# Patient Record
Sex: MALE | Race: WHITE | NOT HISPANIC OR LATINO | Employment: OTHER | URBAN - METROPOLITAN AREA
[De-identification: names, ages, dates, MRNs, and addresses within clinical notes are randomized per-mention and may not be internally consistent; named-entity substitution may affect disease eponyms.]

---

## 2020-01-20 ENCOUNTER — OFFICE VISIT (OUTPATIENT)
Dept: URGENT CARE | Facility: CLINIC | Age: 70
End: 2020-01-20
Payer: MEDICARE

## 2020-01-20 VITALS
TEMPERATURE: 98.1 F | HEIGHT: 71 IN | WEIGHT: 220 LBS | HEART RATE: 82 BPM | SYSTOLIC BLOOD PRESSURE: 138 MMHG | BODY MASS INDEX: 30.8 KG/M2 | OXYGEN SATURATION: 96 % | DIASTOLIC BLOOD PRESSURE: 86 MMHG | RESPIRATION RATE: 18 BRPM

## 2020-01-20 DIAGNOSIS — J40 BRONCHITIS: Primary | ICD-10-CM

## 2020-01-20 DIAGNOSIS — J06.9 URI WITH COUGH AND CONGESTION: ICD-10-CM

## 2020-01-20 PROCEDURE — 99204 OFFICE O/P NEW MOD 45 MIN: CPT | Performed by: PHYSICIAN ASSISTANT

## 2020-01-20 RX ORDER — DOXYCYCLINE HYCLATE 100 MG
100 TABLET ORAL 2 TIMES DAILY
Qty: 14 TAB | Refills: 0 | Status: SHIPPED | OUTPATIENT
Start: 2020-01-20 | End: 2020-01-20 | Stop reason: SDUPTHER

## 2020-01-20 RX ORDER — METHYLPREDNISOLONE 4 MG/1
TABLET ORAL
Qty: 21 TAB | Refills: 0 | Status: SHIPPED | OUTPATIENT
Start: 2020-01-20 | End: 2020-01-20 | Stop reason: SDUPTHER

## 2020-01-20 RX ORDER — DOXYCYCLINE HYCLATE 100 MG
100 TABLET ORAL 2 TIMES DAILY
Qty: 14 TAB | Refills: 0 | Status: SHIPPED | OUTPATIENT
Start: 2020-01-20 | End: 2020-01-27

## 2020-01-20 RX ORDER — BENZONATATE 100 MG/1
100 CAPSULE ORAL 3 TIMES DAILY PRN
Qty: 21 CAP | Refills: 0 | Status: SHIPPED | OUTPATIENT
Start: 2020-01-20 | End: 2020-01-20 | Stop reason: SDUPTHER

## 2020-01-20 RX ORDER — BENZONATATE 100 MG/1
100 CAPSULE ORAL 3 TIMES DAILY PRN
Qty: 21 CAP | Refills: 0 | Status: SHIPPED | OUTPATIENT
Start: 2020-01-20 | End: 2020-01-27

## 2020-01-20 RX ORDER — METHYLPREDNISOLONE 4 MG/1
TABLET ORAL
Qty: 21 TAB | Refills: 0 | Status: SHIPPED | OUTPATIENT
Start: 2020-01-20 | End: 2021-04-15

## 2020-01-20 NOTE — PATIENT INSTRUCTIONS
Acute Bronchitis, Adult  Acute bronchitis is when air tubes (bronchi) in the lungs suddenly get swollen. The condition can make it hard to breathe. It can also cause these symptoms:  · A cough.  · Coughing up clear, yellow, or green mucus.  · Wheezing.  · Chest congestion.  · Shortness of breath.  · A fever.  · Body aches.  · Chills.  · A sore throat.  Follow these instructions at home:  Medicines  · Take over-the-counter and prescription medicines only as told by your doctor.  · If you were prescribed an antibiotic medicine, take it as told by your doctor. Do not stop taking the antibiotic even if you start to feel better.  General instructions  · Rest.  · Drink enough fluids to keep your pee (urine) clear or pale yellow.  · Avoid smoking and secondhand smoke. If you smoke and you need help quitting, ask your doctor. Quitting will help your lungs heal faster.  · Use an inhaler, cool mist vaporizer, or humidifier as told by your doctor.  · Keep all follow-up visits as told by your doctor. This is important.  How is this prevented?  To lower your risk of getting this condition again:  · Wash your hands often with soap and water. If you cannot use soap and water, use hand .  · Avoid contact with people who have cold symptoms.  · Try not to touch your hands to your mouth, nose, or eyes.  · Make sure to get the flu shot every year.  Contact a doctor if:  · Your symptoms do not get better in 2 weeks.  Get help right away if:  · You cough up blood.  · You have chest pain.  · You have very bad shortness of breath.  · You become dehydrated.  · You faint (pass out) or keep feeling like you are going to pass out.  · You keep throwing up (vomiting).  · You have a very bad headache.  · Your fever or chills gets worse.  This information is not intended to replace advice given to you by your health care provider. Make sure you discuss any questions you have with your health care provider.  Document Released: 06/05/2009  Document Revised: 07/26/2017 Document Reviewed: 06/07/2017  [employer] Interactive Patient Education © 2017 [employer]

## 2020-01-20 NOTE — PROGRESS NOTES
Subjective:   Pt is a 70 y.o. male who presents with URI (x1 week head cold otc medication not working )            HPI  This is a new problem. PT presents to  clinic today complaining of sore throat,  pressure in ears, cough, fatigue, runny nose, wheezing and SOB. PT denies CP, NVD, abdominal pain, joint pain. PT states these symptoms began around 7 days ago. Pt states OTC meds are not helping him. PT states the pain is a 4/10 with coughing fits, aching in nature and worse at night. Pt has not taken any RX medications for this condition. The pt's medication list, problem list, and allergies have been evaluated and reviewed during today's visit.    PMH:  Past Medical History:   Diagnosis Date   • Hypertension    • Type II or unspecified type diabetes mellitus without mention of complication, not stated as uncontrolled        PSH:  Negative per pt.      Fam Hx:  the patient's family history is not pertinent to their current complaint      Soc HX:  Social History     Socioeconomic History   • Marital status: Single     Spouse name: Not on file   • Number of children: Not on file   • Years of education: Not on file   • Highest education level: Not on file   Occupational History   • Not on file   Social Needs   • Financial resource strain: Not on file   • Food insecurity:     Worry: Not on file     Inability: Not on file   • Transportation needs:     Medical: Not on file     Non-medical: Not on file   Tobacco Use   • Smoking status: Never Smoker   • Smokeless tobacco: Never Used   Substance and Sexual Activity   • Alcohol use: Yes     Comment: occ   • Drug use: No   • Sexual activity: Not on file   Lifestyle   • Physical activity:     Days per week: Not on file     Minutes per session: Not on file   • Stress: Not on file   Relationships   • Social connections:     Talks on phone: Not on file     Gets together: Not on file     Attends Orthodox service: Not on file     Active member of club or organization: Not on file      Attends meetings of clubs or organizations: Not on file     Relationship status: Not on file   • Intimate partner violence:     Fear of current or ex partner: Not on file     Emotionally abused: Not on file     Physically abused: Not on file     Forced sexual activity: Not on file   Other Topics Concern   • Not on file   Social History Narrative   • Not on file         Medications:    Current Outpatient Medications:   •  doxycycline (VIBRAMYCIN) 100 MG Tab, Take 1 Tab by mouth 2 times a day for 7 days., Disp: 14 Tab, Rfl: 0  •  methylPREDNISolone (MEDROL DOSEPAK) 4 MG Tablet Therapy Pack, Follow schedule on package instructions., Disp: 21 Tab, Rfl: 0  •  benzonatate (TESSALON PERLES) 100 MG Cap, Take 1 Cap by mouth 3 times a day as needed for Cough for up to 7 days., Disp: 21 Cap, Rfl: 0  •  Alogliptin-Metformin HCl (KAZANO) 12.5-1000 MG TABS, Take 1 Tab by mouth 2 Times a Day., Disp: , Rfl:   •  glipiZIDE (GLUCOTROL) 5 MG TABS, Take 5 mg by mouth 2 times a day., Disp: , Rfl:   •  atorvastatin (LIPITOR) 40 MG TABS, Take 40 mg by mouth every evening., Disp: , Rfl:   •  lisinopril-hydrochlorothiazide (PRINZIDE, ZESTORETIC) 20-25 MG per tablet, Take 1 Tab by mouth every day., Disp: , Rfl:   •  atenolol (TENORMIN) 100 MG TABS, Take 100 mg by mouth every day., Disp: , Rfl:       Allergies:  Patient has no known allergies.    ROS    Review of Systems   Constitutional: Positive for malaise/fatigue. Negative for fever and diaphoresis.   HENT: Positive for congestion and sore throat. Negative for ear discharge, hearing loss, nosebleeds and tinnitus.    Eyes: Negative for blurred vision, double vision and photophobia.   Respiratory: Positive for cough, sputum production, shortness of breath and wheezing. Negative for hemoptysis.    Cardiovascular: Negative for chest pain and palpitations.   Gastrointestinal: Negative for nausea, vomiting, abdominal pain, diarrhea and constipation.   Genitourinary: Negative for dysuria and  "flank pain.   Musculoskeletal: Negative for joint pain and myalgias.   Skin: Negative for itching and rash.   Neurological:  Negative for dizziness, tingling and weakness.   Endo/Heme/Allergies: Does not bruise/bleed easily.   Psychiatric/Behavioral: Negative for depression. The patient is not nervous/anxious.           Objective:     /86   Pulse 82   Temp 36.7 °C (98.1 °F)   Resp 18   Ht 1.803 m (5' 11\")   Wt 99.8 kg (220 lb)   SpO2 96%   BMI 30.68 kg/m²      Physical Exam      Physical Exam   Constitutional: PT is oriented to person, place, and time. PT appears well-developed and well-nourished. No distress.   HENT:   Head: Normocephalic and atraumatic.   Right Ear: Hearing, tympanic membrane, external ear and ear canal normal.   Left Ear: Hearing, tympanic membrane, external ear and ear canal normal.   Nose: Mucosal edema, rhinorrhea and sinus tenderness present. Right sinus exhibits frontal sinus tenderness. Left sinus exhibits frontal sinus tenderness.   Mouth/Throat: Uvula is midline. Mucous membranes are pale. Posterior oropharyngeal edema and posterior oropharyngeal erythema present. No oropharyngeal exudate.   Eyes: Conjunctivae normal and EOM are normal. Pupils are equal, round, and reactive to light. Right eye exhibits no discharge. Left eye exhibits no discharge.   Neck: Normal range of motion. Neck supple. No thyromegaly present.   Cardiovascular: Normal rate, regular rhythm, normal heart sounds and intact distal pulses.  Exam reveals no gallop and no friction rub.    No murmur heard.  Pulmonary/Chest: Effort normal. No respiratory distress. PT has wheezes. PT has no rales. PT exhibits tenderness.   Abdominal: Soft. Bowel sounds are normal. PT exhibits no distension and no mass. There is no tenderness. There is no rebound and no guarding.   Musculoskeletal: Normal range of motion. PT exhibits no edema and no tenderness.   Lymphadenopathy:     PT has no cervical adenopathy.   Neurological: " Pt is alert and oriented to person, place, and time. Pt has normal reflexes. No cranial nerve deficit.   Skin: Skin is warm and dry. No rash noted. No erythema.   Psychiatric: PT has a normal mood and affect. Pt behavior is normal. Judgment and thought content normal.          Assessment/Plan:       1. Bronchitis    - doxycycline (VIBRAMYCIN) 100 MG Tab; Take 1 Tab by mouth 2 times a day for 7 days.  Dispense: 14 Tab; Refill: 0  - methylPREDNISolone (MEDROL DOSEPAK) 4 MG Tablet Therapy Pack; Follow schedule on package instructions.  Dispense: 21 Tab; Refill: 0    2. URI with cough and congestion    - methylPREDNISolone (MEDROL DOSEPAK) 4 MG Tablet Therapy Pack; Follow schedule on package instructions.  Dispense: 21 Tab; Refill: 0  - benzonatate (TESSALON PERLES) 100 MG Cap; Take 1 Cap by mouth 3 times a day as needed for Cough for up to 7 days.  Dispense: 21 Cap; Refill: 0      Concern for worsening symptoms of URI which shortly could transition to pneumonia and worsening sinus congestion and infection with powerful cough keeping pt up at night as they must sleep upright to avoid coughing fits.  Diff DX: Bronchitis, Sinusitis, Pneumonia, Influenza, Viral URI, Allergies  Rest, fluids encouraged.  OTC decongestant for congestion/cough  AVS with medical info given.  Pt was in full understanding and agreement with the plan.  Differential diagnosis, natural history, supportive care, and indications for immediate follow-up discussed. All questions answered. Patient agrees with the plan of care.  Follow-up as needed if symptoms worsen or fail to improve to PCP, Urgent care or Emergency Room.

## 2020-10-14 ENCOUNTER — HOSPITAL ENCOUNTER (OUTPATIENT)
Dept: HOSPITAL 8 - CVU | Age: 70
Discharge: HOME | End: 2020-10-14
Attending: NURSE PRACTITIONER
Payer: MEDICARE

## 2020-10-14 DIAGNOSIS — I11.9: ICD-10-CM

## 2020-10-14 DIAGNOSIS — E78.5: ICD-10-CM

## 2020-10-14 DIAGNOSIS — I35.8: Primary | ICD-10-CM

## 2020-10-14 DIAGNOSIS — E11.69: ICD-10-CM

## 2020-10-14 PROCEDURE — 93306 TTE W/DOPPLER COMPLETE: CPT

## 2021-02-07 ENCOUNTER — OFFICE VISIT (OUTPATIENT)
Dept: URGENT CARE | Facility: PHYSICIAN GROUP | Age: 71
End: 2021-02-07
Payer: MEDICARE

## 2021-02-07 ENCOUNTER — HOSPITAL ENCOUNTER (OUTPATIENT)
Dept: RADIOLOGY | Facility: MEDICAL CENTER | Age: 71
End: 2021-02-07
Attending: NURSE PRACTITIONER
Payer: MEDICARE

## 2021-02-07 VITALS
RESPIRATION RATE: 20 BRPM | HEIGHT: 71 IN | DIASTOLIC BLOOD PRESSURE: 84 MMHG | OXYGEN SATURATION: 92 % | HEART RATE: 85 BPM | WEIGHT: 302 LBS | TEMPERATURE: 97.3 F | SYSTOLIC BLOOD PRESSURE: 122 MMHG | BODY MASS INDEX: 42.28 KG/M2

## 2021-02-07 DIAGNOSIS — S49.92XA INJURY OF LEFT SHOULDER, INITIAL ENCOUNTER: ICD-10-CM

## 2021-02-07 DIAGNOSIS — S42.292A HUMERAL HEAD FRACTURE, LEFT, CLOSED, INITIAL ENCOUNTER: ICD-10-CM

## 2021-02-07 PROCEDURE — 99213 OFFICE O/P EST LOW 20 MIN: CPT | Performed by: NURSE PRACTITIONER

## 2021-02-07 PROCEDURE — 73030 X-RAY EXAM OF SHOULDER: CPT | Mod: LT

## 2021-02-07 NOTE — PROGRESS NOTES
Subjective:      Bennett Callahan is a 71 y.o. male who presents with Fall (L shoulder [id number] )    Past Medical History:   Diagnosis Date   • Hypertension    • Type II or unspecified type diabetes mellitus without mention of complication, not stated as uncontrolled      Social History     Socioeconomic History   • Marital status: Single     Spouse name: Not on file   • Number of children: Not on file   • Years of education: Not on file   • Highest education level: Not on file   Occupational History   • Not on file   Social Needs   • Financial resource strain: Not on file   • Food insecurity     Worry: Not on file     Inability: Not on file   • Transportation needs     Medical: Not on file     Non-medical: Not on file   Tobacco Use   • Smoking status: Never Smoker   • Smokeless tobacco: Never Used   Substance and Sexual Activity   • Alcohol use: Yes     Comment: occ   • Drug use: No   • Sexual activity: Not on file   Lifestyle   • Physical activity     Days per week: Not on file     Minutes per session: Not on file   • Stress: Not on file   Relationships   • Social connections     Talks on phone: Not on file     Gets together: Not on file     Attends Shinto service: Not on file     Active member of club or organization: Not on file     Attends meetings of clubs or organizations: Not on file     Relationship status: Not on file   • Intimate partner violence     Fear of current or ex partner: Not on file     Emotionally abused: Not on file     Physically abused: Not on file     Forced sexual activity: Not on file   Other Topics Concern   • Not on file   Social History Narrative   • Not on file     History reviewed. No pertinent family history.    Allergies: Patient has no known allergies.    Patient is a 71-year-old male who presents today with complaint of pain to the left shoulder.  States 2 days ago he fell on his front porch.  States he was bringing groceries in the house and he had some ice on the front  "porch and he slipped and fell.  States he fell somehow on the left arm and shoulder.  He has been having pain with decreased range of motion since.  He has noted soft tissue swelling and discoloration to the upper arm area.  Patient has been using a sling at home to immobilize the shoulder to control pain.  No other injuries.  Patient denies numbness, tingling, weakness, or paresthesia to the affected arm or hand          Fall  The accident occurred 2 days ago. The fall occurred while walking. Distance fallen: GLF. There was no blood loss. Point of impact: left shoulder  Pain location: left shoulder  The pain is moderate. The symptoms are aggravated by use of injured limb. He has tried nothing for the symptoms. The treatment provided no relief.       Review of Systems   Musculoskeletal:        Acute left shoulder pain   All other systems reviewed and are negative.         Objective:     /84   Pulse 85   Temp 36.3 °C (97.3 °F) (Temporal)   Resp 20   Ht 1.803 m (5' 11\")   Wt (!) 137 kg (302 lb)   SpO2 92%   BMI 42.12 kg/m²      Physical Exam  Vitals signs reviewed.   Constitutional:       Appearance: Normal appearance.   Musculoskeletal:        Arms:       Comments: Point tenderness over the anterior left shoulder and left upper arm.  There is soft tissue swelling noted to the upper arm and discoloration to the medial aspect of the left upper arm.  Patient is not able to abduct in any direction and is unable to abduct.  Radial pulses 2+, hand and digits on the affected side are pink and warm with capillary refill less than 2 seconds.     Skin:     Capillary Refill: Capillary refill takes less than 2 seconds.   Neurological:      General: No focal deficit present.      Mental Status: He is alert and oriented to person, place, and time.   Psychiatric:         Mood and Affect: Mood normal.         Behavior: Behavior normal.         Thought Content: Thought content normal.         Judgment: Judgment normal.   "       Discussed results of x-ray with patient.  Also counseled patient to monitor for any neurovascular changes including numbness, tingling, weakness in the left hand, cyanosis/color changes, or coolness in comparison with the uninjured side.          XR shoulder :     2/7/2021 11:08 AM     HISTORY/REASON FOR EXAM:  Left shoulder pain. L2 days ago.     TECHNIQUE/EXAM DESCRIPTION AND NUMBER OF VIEWS:  3 views of the LEFT shoulder.     COMPARISON: None     FINDINGS:  Bone mineralization is osteopenic.  There is a comminuted fracture of the left proximal humerus which involves the proximal diaphysis and metaphysis. There is a component extending into the tuberosities as well as the humeral neck.     There is soft tissue swelling.     IMPRESSION:     Comminuted and mildly displaced proximal humeral fracture.      Assessment/Plan:   Left humeral head fracture  Left shoulder injury    Referral given to orthopedics; patient will follow up with [facility] tomorrow.  Shoulder immobilizer placed  Strict ER precautions given for any neurovascular changes  Ibuprofen  Ice  Rest     There are no diagnoses linked to this encounter.

## 2021-04-15 ENCOUNTER — OFFICE VISIT (OUTPATIENT)
Dept: MEDICAL GROUP | Facility: PHYSICIAN GROUP | Age: 71
End: 2021-04-15
Payer: MEDICARE

## 2021-04-15 VITALS
RESPIRATION RATE: 18 BRPM | HEIGHT: 71 IN | BODY MASS INDEX: 40.32 KG/M2 | DIASTOLIC BLOOD PRESSURE: 86 MMHG | HEART RATE: 72 BPM | SYSTOLIC BLOOD PRESSURE: 128 MMHG | OXYGEN SATURATION: 94 % | WEIGHT: 288 LBS | TEMPERATURE: 98 F

## 2021-04-15 DIAGNOSIS — M79.89 LEG SWELLING: ICD-10-CM

## 2021-04-15 DIAGNOSIS — I48.91 ATRIAL FIBRILLATION, UNSPECIFIED TYPE (HCC): ICD-10-CM

## 2021-04-15 DIAGNOSIS — I10 ESSENTIAL HYPERTENSION: Chronic | ICD-10-CM

## 2021-04-15 DIAGNOSIS — Z11.59 NEED FOR HEPATITIS C SCREENING TEST: ICD-10-CM

## 2021-04-15 DIAGNOSIS — Z13.6 SCREENING FOR CARDIOVASCULAR CONDITION: ICD-10-CM

## 2021-04-15 DIAGNOSIS — Z13.0 SCREENING FOR DEFICIENCY ANEMIA: ICD-10-CM

## 2021-04-15 DIAGNOSIS — E78.00 HYPERCHOLESTEROLEMIA: Chronic | ICD-10-CM

## 2021-04-15 DIAGNOSIS — E11.42 DIABETIC POLYNEUROPATHY ASSOCIATED WITH TYPE 2 DIABETES MELLITUS (HCC): ICD-10-CM

## 2021-04-15 DIAGNOSIS — Z13.29 SCREENING FOR ENDOCRINE DISORDER: ICD-10-CM

## 2021-04-15 DIAGNOSIS — Z12.11 COLON CANCER SCREENING: ICD-10-CM

## 2021-04-15 DIAGNOSIS — Z79.4 INSULIN LONG-TERM USE (HCC): ICD-10-CM

## 2021-04-15 PROCEDURE — 99204 OFFICE O/P NEW MOD 45 MIN: CPT | Performed by: INTERNAL MEDICINE

## 2021-04-15 RX ORDER — FUROSEMIDE 20 MG/1
20 TABLET ORAL 2 TIMES DAILY
COMMUNITY
End: 2021-08-02 | Stop reason: SDUPTHER

## 2021-04-15 RX ORDER — METOPROLOL SUCCINATE 25 MG/1
TABLET, EXTENDED RELEASE ORAL
COMMUNITY
Start: 2021-03-03 | End: 2021-04-15

## 2021-04-15 RX ORDER — ATORVASTATIN CALCIUM 40 MG/1
40 TABLET, FILM COATED ORAL DAILY
Qty: 90 TABLET | Refills: 3 | Status: SHIPPED | OUTPATIENT
Start: 2021-04-15 | End: 2021-08-02 | Stop reason: SDUPTHER

## 2021-04-15 RX ORDER — INSULIN GLARGINE 300 U/ML
INJECTION, SOLUTION SUBCUTANEOUS
COMMUNITY
Start: 2021-03-08 | End: 2021-08-02

## 2021-04-15 RX ORDER — APIXABAN 5 MG/1
TABLET, FILM COATED ORAL
COMMUNITY
Start: 2021-02-01 | End: 2021-08-02 | Stop reason: SDUPTHER

## 2021-04-15 ASSESSMENT — PATIENT HEALTH QUESTIONNAIRE - PHQ9: CLINICAL INTERPRETATION OF PHQ2 SCORE: 0

## 2021-04-15 NOTE — LETTER
[facility]  Jonathan Mazariegos M.D.  [address]  [facility] [place] [address]  Fax: [phone]   Authorization for Release/Disclosure of   Protected Health Information   Name: ELROY CALLAHAN : 1950 SSN: [id number]   Address: [place] [address] Phone:    [phone] (home)    I authorize the entity listed below to release/disclose the PHI below to:   [facility]/Jonathan Mazariegos M.D. and Jonathan Mazariegos M.D.   Provider or Entity Name:  {[facility] COLORECTAL SCREENING LOCATIONS:[id number]}   Reason for request: continuity of care   Information to be released:    [ X ] LAST COLONOSCOPY,  including any PATH REPORT and follow-up  [ X ] LAST FIT/COLOGUARD RESULT [  ] LAST DEXA  [  ] LAST MAMMOGRAM  [  ] LAST PAP  [  ] LAST LABS [  ] RETINA EXAM REPORT  [  ] IMMUNIZATION RECORDS  [  ] Release all info      [  ] Check here and initial the line next to each item to release ALL health information INCLUDING  _____ Care and treatment for drug and / or alcohol abuse  _____ HIV testing, infection status, or AIDS  _____ Genetic Testing    DATES OF SERVICE OR TIME PERIOD TO BE DISCLOSED: _____________  I understand and acknowledge that:  * This Authorization may be revoked at any time by you in writing, except if your health information has already been used or disclosed.  * Your health information that will be used or disclosed as a result of you signing this authorization could be re-disclosed by the recipient. If this occurs, your re-disclosed health information may no longer be protected by State or Federal laws.  * You may refuse to sign this Authorization. Your refusal will not affect your ability to obtain treatment.  * This Authorization becomes effective upon signing and will  on (date) __________.      If no date is indicated, this Authorization will  one (1) year from the signature date.    Name: Elroy Callahan    Signature:   Date:     4/15/2021       PLEASE FAX REQUESTED RECORDS BACK TO: [phone]  [phone]

## 2021-04-15 NOTE — PROGRESS NOTES
CC: New provider  Follow-up diabetes      HPI: This is a 71 y.o. pt.  Pt's medical history is notable for:     Atrial fibrillation (HCC)  This is a chronic condition.  The patient is presently followed by cardiologist at [place].  He is taking Eliquis twice daily.  Denies any history of bleeding.  Stressed importance to take the medication as prescribed.  Patient denies chest pain shortness of breath.    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  Patient is currently on Toujeo Solostar 30 units daily.  Patient denies significant hypoglycemic symptoms.  Lab tests ordered for follow-up.  Patient also take Metformin 500 mg p.o. daily.  Lab tests ordered for follow-up.    Insulin long-term use (HCC)  Patient on chronic insulin use.  No complication or problem noted by the patient    HTN (hypertension)  Chronic condition.  The patient currently taking lisinopril/hydrochlorothiazide.  Patient blood pressure today 120/86.    Hypercholesterolemia  Chronic condition.  The patient takes Lipitor daily.  Lab tests ordered for follow-up.    Leg swelling  Chronic condition.  The patient takes furosemide as needed.  Currently he is asymptomatic.          REVIEW OF SYSTEMS:     Constitutional:  no fever / chills   Neurologic: no headaches  Eyes: no changes in vision  ENT: no sore throat, no hearing loss  CV:  no chest pain, no palpitations  Pulmonary: no SOB, no cough          Allergies: Patient has no known allergies.    Current Outpatient Medications Ordered in Epic   Medication Sig Dispense Refill   • ELIQUIS 5 MG Tab TAKE 1 TABLET BY MOUTH TWICE A DAY     • TOUJEO SOLOSTAR 300 UNIT/ML Solution Pen-injector inject 30 units subcutaneously once daily     • furosemide (LASIX) 20 MG Tab Take 20 mg by mouth 2 times a day.     • metFORMIN (GLUCOPHAGE) 500 MG Tab Take 500 mg by mouth 2 times a day with meals.     • atorvastatin (LIPITOR) 40 MG Tab Take 1 tablet by mouth every day. 90 tablet  3   • Insulin Pen Needle 32 G x 4 mm For Toujeo BD  Sterile needles  0.23 x 4mm  32Gx 5/32 100 Each 6   • lisinopril-hydrochlorothiazide (PRINZIDE, ZESTORETIC) 20-25 MG per tablet Take 1 Tab by mouth every day.       No current [facility]-ordered facility-administered medications on file.       Past Medical, Social, and Family history reviewed and updated in EPIC     ------------------------------------------------------------------------------     PHYSICAL EXAM:   Vitals:    04/15/21 1401   BP: 128/86   Pulse: 72   Resp: 18   Temp: 36.7 °C (98 °F)   SpO2: 94%      Body mass index is 40.17 kg/m².         Constitutional: no acute distress  Neck: supple, no JVD  CV: heart RRR  Resp: normal effort, no wheezing or rales.  GI: abdomen soft, no obvious mass, no tenderness  Neuro: CN 2-12 grossly intact    Monofilament testing with a 10 gram force: sensation intact: decreased bilaterally  Visual Inspection: Feet without maceration, ulcers, fissures.  Pedal pulses: decreased bilaterally    -----------------------------------------------------------------------------    ASSESSMENT:   1. Atrial fibrillation, unspecified type (HCC)     2. Leg swelling     3. Colon cancer screening  COLOGUARD (FIT DNA)    CANCELED: REFERRAL TO GI FOR COLONOSCOPY   4. Insulin long-term use (HCC)     5. Diabetic polyneuropathy associated with type 2 diabetes mellitus (HCC)  HEMOGLOBIN A1C    Basic Metabolic Panel    TSH    MICROALBUMIN CREAT RATIO URINE    REFERRAL TO OPHTHALMOLOGY   6. Screening for cardiovascular condition  Lipid Profile   7. Screening for endocrine disorder  ALANINE AMINO-TRANS   8. Screening for deficiency anemia  CBC WITH DIFFERENTIAL   9. Need for hepatitis C screening test  HEP C VIRUS ANTIBODY   10. Essential hypertension     11. Hypercholesterolemia             MEDICAL DECISION MAKING: DISCUSSION / STATUS / PLAN:    Atrial fibrillation.  Chronic condition.  Stressed importance the patient to continue with Eliquis.  Continue  follow-up with cardiologist.    Diabetic neuropathy.  Uncontrolled is unclear.  Lab tests include A1c ordered.  Refer the patient to ophthalmology for diabetic eye exam.  Stressed importance of diet and exercise.  Patient will try to lose some weight.    Hypertension.  Stable continue current management.    Hyperlipidemia.  Continue with atorvastatin.  Lipid panel ordered.    Health maintenance.  Patient declined colonoscopy.  He has agreed to do a Cologuard which was ordered.     Return in about 6 months (around 10/15/2021).       PATIENT EDUCATION:  -If any problems should arise, patient was advised to contact our office or go to ER to be evaluated.      Please note that this dictation was created using voice recognition software. I have made every reasonable attempt to correct obvious errors, but it is possible there are errors of grammar and possibly content that I did not discover before finalizing the note.

## 2021-04-15 NOTE — ASSESSMENT & PLAN NOTE
This is a chronic condition.  The patient is presently followed by cardiologist at [place].  He is taking Eliquis twice daily.  Denies any history of bleeding.  Stressed importance to take the medication as prescribed.  Patient denies chest pain shortness of breath.

## 2021-04-15 NOTE — ASSESSMENT & PLAN NOTE
Chronic condition.  Patient is currently on Toujeo Solostar 30 units daily.  Patient denies significant hypoglycemic symptoms.  Lab tests ordered for follow-up.  Patient also take Metformin 500 mg p.o. daily.  Lab tests ordered for follow-up.

## 2021-04-15 NOTE — LETTER
[facility]  Jonathan Mazariegos M.D.  [address]  [facility] [address]  Fax: [phone]   Authorization for Release/Disclosure of   Protected Health Information   Name: ELROY CALLAHAN : 1950 SSN: [id number]   Address: [place] [address] Phone:    [phone] (home)    I authorize the entity listed below to release/disclose the PHI below to:   [facility]/Jonathan Mazariegos M.D. and Jonathan Mazariegos M.D.   Provider or Entity Name:  {[facility] COLORECTAL SCREENING LOCATIONS:[id number]}   Reason for request: continuity of care   Information to be released:    [ X ] LAST COLONOSCOPY,  including any PATH REPORT and follow-up  [ X ] LAST FIT/COLOGUARD RESULT [  ] LAST DEXA  [  ] LAST MAMMOGRAM  [  ] LAST PAP  [  ] LAST LABS [  ] RETINA EXAM REPORT  [  ] IMMUNIZATION RECORDS  [  ] Release all info      [  ] Check here and initial the line next to each item to release ALL health information INCLUDING  _____ Care and treatment for drug and / or alcohol abuse  _____ HIV testing, infection status, or AIDS  _____ Genetic Testing    DATES OF SERVICE OR TIME PERIOD TO BE DISCLOSED: _____________  I understand and acknowledge that:  * This Authorization may be revoked at any time by you in writing, except if your health information has already been used or disclosed.  * Your health information that will be used or disclosed as a result of you signing this authorization could be re-disclosed by the recipient. If this occurs, your re-disclosed health information may no longer be protected by State or Federal laws.  * You may refuse to sign this Authorization. Your refusal will not affect your ability to obtain treatment.  * This Authorization becomes effective upon signing and will  on (date) __________.      If no date is indicated, this Authorization will  one (1) year from the signature date.    Name: Elroy Callahan    Signature:   Date:     4/15/2021       PLEASE FAX REQUESTED RECORDS BACK TO: [phone]  [phone]

## 2021-04-15 NOTE — ASSESSMENT & PLAN NOTE
Chronic condition.  The patient currently taking lisinopril/hydrochlorothiazide.  Patient blood pressure today 120/86.

## 2021-06-11 ENCOUNTER — TELEPHONE (OUTPATIENT)
Dept: MEDICAL GROUP | Facility: PHYSICIAN GROUP | Age: 71
End: 2021-06-11

## 2021-06-11 LAB
ALBUMIN/CREAT UR: 2794 MG/G CREAT (ref 0–29)
ALT SERPL-CCNC: 15 IU/L (ref 0–44)
BASOPHILS # BLD AUTO: 0.1 X10E3/UL (ref 0–0.2)
BASOPHILS NFR BLD AUTO: 1 %
BUN SERPL-MCNC: 21 MG/DL (ref 8–27)
BUN/CREAT SERPL: 14 (ref 10–24)
CALCIUM SERPL-MCNC: 9.2 MG/DL (ref 8.6–10.2)
CHLORIDE SERPL-SCNC: 95 MMOL/L (ref 96–106)
CHOLEST SERPL-MCNC: 160 MG/DL (ref 100–199)
CO2 SERPL-SCNC: 22 MMOL/L (ref 20–29)
CREAT SERPL-MCNC: 1.5 MG/DL (ref 0.76–1.27)
CREAT UR-MCNC: 18.3 MG/DL
EOSINOPHIL # BLD AUTO: 0.6 X10E3/UL (ref 0–0.4)
EOSINOPHIL NFR BLD AUTO: 6 %
ERYTHROCYTE [DISTWIDTH] IN BLOOD BY AUTOMATED COUNT: 13.6 % (ref 11.6–15.4)
GLUCOSE SERPL-MCNC: 231 MG/DL (ref 65–99)
HBA1C MFR BLD: 9.5 % (ref 4.8–5.6)
HCT VFR BLD AUTO: 48.1 % (ref 37.5–51)
HCV AB S/CO SERPL IA: <0.1 S/CO RATIO (ref 0–0.9)
HDLC SERPL-MCNC: 36 MG/DL
HGB BLD-MCNC: 16.7 G/DL (ref 13–17.7)
IMM GRANULOCYTES # BLD AUTO: 0 X10E3/UL (ref 0–0.1)
IMM GRANULOCYTES NFR BLD AUTO: 0 %
IMMATURE CELLS  115398: ABNORMAL
LABORATORY COMMENT REPORT: ABNORMAL
LDLC SERPL CALC-MCNC: 95 MG/DL (ref 0–99)
LYMPHOCYTES # BLD AUTO: 4.2 X10E3/UL (ref 0.7–3.1)
LYMPHOCYTES NFR BLD AUTO: 40 %
MCH RBC QN AUTO: 30.5 PG (ref 26.6–33)
MCHC RBC AUTO-ENTMCNC: 34.7 G/DL (ref 31.5–35.7)
MCV RBC AUTO: 88 FL (ref 79–97)
MICROALBUMIN UR-MCNC: 511.3 UG/ML
MONOCYTES # BLD AUTO: 0.8 X10E3/UL (ref 0.1–0.9)
MONOCYTES NFR BLD AUTO: 8 %
MORPHOLOGY BLD-IMP: ABNORMAL
NEUTROPHILS # BLD AUTO: 4.9 X10E3/UL (ref 1.4–7)
NEUTROPHILS NFR BLD AUTO: 45 %
NRBC BLD AUTO-RTO: ABNORMAL %
PLATELET # BLD AUTO: 264 X10E3/UL (ref 150–450)
POTASSIUM SERPL-SCNC: 3.7 MMOL/L (ref 3.5–5.2)
RBC # BLD AUTO: 5.48 X10E6/UL (ref 4.14–5.8)
SODIUM SERPL-SCNC: 137 MMOL/L (ref 134–144)
TRIGL SERPL-MCNC: 163 MG/DL (ref 0–149)
TSH SERPL DL<=0.005 MIU/L-ACNC: 3.28 UIU/ML (ref 0.45–4.5)
VLDLC SERPL CALC-MCNC: 29 MG/DL (ref 5–40)
WBC # BLD AUTO: 10.7 X10E3/UL (ref 3.4–10.8)

## 2021-06-11 NOTE — TELEPHONE ENCOUNTER
----- Message from Chani Buck P.A.-C. sent at 6/11/2021  9:22 AM PDT -----  Please call the patient let him know that I am covering for Dr. Mazariegos while he is out of the office.  I have reviewed his blood work and it showed that his diabetes is not well controlled and there have been some changes in his kidney function, likely as a result of his uncontrolled diabetes.  I know he had a 6-month follow-up with Dr. Mazariegos scheduled, however I suggest that he moves his appointment up sooner to the next month or 2 so they can discuss how they are going to better control his diabetes. (Please help the patient make an appointment if they are interested in this).     Thank you,  Chani Buck PA-C

## 2021-06-11 NOTE — TELEPHONE ENCOUNTER
Spoke with Bennett and relayed recent labs results via PCP. I also sent information to pt's mychart for reference. Pt thanked me for the call.

## 2021-08-02 ENCOUNTER — OFFICE VISIT (OUTPATIENT)
Dept: MEDICAL GROUP | Facility: PHYSICIAN GROUP | Age: 71
End: 2021-08-02
Payer: MEDICARE

## 2021-08-02 VITALS
TEMPERATURE: 97.8 F | OXYGEN SATURATION: 96 % | HEART RATE: 92 BPM | HEIGHT: 71 IN | WEIGHT: 291 LBS | DIASTOLIC BLOOD PRESSURE: 84 MMHG | RESPIRATION RATE: 18 BRPM | BODY MASS INDEX: 40.74 KG/M2 | SYSTOLIC BLOOD PRESSURE: 136 MMHG

## 2021-08-02 DIAGNOSIS — E11.59 HYPERTENSION ASSOCIATED WITH DIABETES (HCC): ICD-10-CM

## 2021-08-02 DIAGNOSIS — Z86.73 HISTORY OF STROKE: ICD-10-CM

## 2021-08-02 DIAGNOSIS — E11.65 TYPE 2 DIABETES MELLITUS WITH HYPERGLYCEMIA, WITH LONG-TERM CURRENT USE OF INSULIN (HCC): ICD-10-CM

## 2021-08-02 DIAGNOSIS — E11.69 HYPERLIPIDEMIA DUE TO TYPE 2 DIABETES MELLITUS (HCC): ICD-10-CM

## 2021-08-02 DIAGNOSIS — I48.91 ATRIAL FIBRILLATION, UNSPECIFIED TYPE (HCC): ICD-10-CM

## 2021-08-02 DIAGNOSIS — N28.9 RENAL INSUFFICIENCY: ICD-10-CM

## 2021-08-02 DIAGNOSIS — Z79.4 INSULIN LONG-TERM USE (HCC): ICD-10-CM

## 2021-08-02 DIAGNOSIS — I15.2 HYPERTENSION ASSOCIATED WITH DIABETES (HCC): ICD-10-CM

## 2021-08-02 DIAGNOSIS — E66.01 MORBID OBESITY (HCC): ICD-10-CM

## 2021-08-02 DIAGNOSIS — R80.0 ISOLATED PROTEINURIA WITHOUT SPECIFIC MORPHOLOGIC LESION: ICD-10-CM

## 2021-08-02 DIAGNOSIS — E78.5 HYPERLIPIDEMIA DUE TO TYPE 2 DIABETES MELLITUS (HCC): ICD-10-CM

## 2021-08-02 DIAGNOSIS — E11.42 DIABETIC POLYNEUROPATHY ASSOCIATED WITH TYPE 2 DIABETES MELLITUS (HCC): ICD-10-CM

## 2021-08-02 DIAGNOSIS — Z79.4 TYPE 2 DIABETES MELLITUS WITH HYPERGLYCEMIA, WITH LONG-TERM CURRENT USE OF INSULIN (HCC): ICD-10-CM

## 2021-08-02 PROCEDURE — 99214 OFFICE O/P EST MOD 30 MIN: CPT | Performed by: INTERNAL MEDICINE

## 2021-08-02 RX ORDER — FUROSEMIDE 20 MG/1
20 TABLET ORAL 2 TIMES DAILY
Qty: 60 TABLET | Refills: 6 | Status: SHIPPED | OUTPATIENT
Start: 2021-08-02 | End: 2021-08-02 | Stop reason: SDUPTHER

## 2021-08-02 RX ORDER — ATORVASTATIN CALCIUM 40 MG/1
40 TABLET, FILM COATED ORAL DAILY
Qty: 90 TABLET | Refills: 3 | Status: SHIPPED | OUTPATIENT
Start: 2021-08-02 | End: 2022-09-12

## 2021-08-02 RX ORDER — FUROSEMIDE 20 MG/1
20 TABLET ORAL
Qty: 30 TABLET | Refills: 5 | Status: SHIPPED | OUTPATIENT
Start: 2021-08-02 | End: 2022-03-24 | Stop reason: SDUPTHER

## 2021-08-02 RX ORDER — APIXABAN 5 MG/1
5 TABLET, FILM COATED ORAL 2 TIMES DAILY
Qty: 60 TABLET | Refills: 6 | Status: SHIPPED | OUTPATIENT
Start: 2021-08-02 | End: 2022-09-12

## 2021-08-02 RX ORDER — LISINOPRIL AND HYDROCHLOROTHIAZIDE 25; 20 MG/1; MG/1
1 TABLET ORAL DAILY
Qty: 90 TABLET | Refills: 3 | Status: SHIPPED | OUTPATIENT
Start: 2021-08-02 | End: 2022-10-12

## 2021-08-02 RX ORDER — INSULIN GLARGINE 300 U/ML
35 INJECTION, SOLUTION SUBCUTANEOUS DAILY
Qty: 4 EACH | Refills: 5 | Status: SHIPPED | OUTPATIENT
Start: 2021-08-02 | End: 2021-10-28 | Stop reason: SDUPTHER

## 2021-08-02 NOTE — ASSESSMENT & PLAN NOTE
Chronic condition.  The patient is currently on Metformin and Toujeo Solostar  Recent blood test show elevated A1c 9.5%.  Patient denies significant hypoglycemic symptoms.  Patient reported that he had a retinal examination proximately 2 weeks ago and no significant finding noted.

## 2021-08-02 NOTE — ASSESSMENT & PLAN NOTE
Chronic condition.  The patient is currently taking atorvastatin.  No significant side effects reported.

## 2021-08-02 NOTE — ASSESSMENT & PLAN NOTE
Noted with recent urine microalbumin.  Patient currently on lisinopril.  I have referred the patient to nephrology for further evaluation.

## 2021-08-02 NOTE — ASSESSMENT & PLAN NOTE
Chronic condition.  The patient presently followed by cardiologist at [facility].  He is currently taking Eliquis twice a day.  No history of bleeding.

## 2021-08-02 NOTE — PROGRESS NOTES
CC: Follow-up diabetes  Refill medications      HPI: This is a 71 y.o. pt.  Pt's medical history is notable for:     Atrial fibrillation (HCC)  Chronic condition.  The patient presently followed by cardiologist at [facility].  He is currently taking Eliquis twice a day.  No history of bleeding.    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  The patient is currently on Metformin and Toujeo Solostar  Recent blood test show elevated A1c 9.5%.  Patient denies significant hypoglycemic symptoms.  Patient reported that he had a retinal examination proximately 2 weeks ago and no significant finding noted.    Insulin long-term use (HCC)  Patient has been using long-term insulin without any issue or problem.    History of stroke  Patient reported prior stroke history.  He is currently taking aspirin 81 mg daily.  Patient also on Eliquis twice a day as recommended by his cardiologist.  No history of bleeding or any new neurologic symptoms noted.    Hyperlipidemia due to type 2 diabetes mellitus (HCC)  Chronic condition.  The patient is currently taking atorvastatin.  No significant side effects reported.    Hypertension associated with diabetes (HCC)  Chronic stable condition.  Patient is currently taking lisinopril/hydrochlorothiazide.    Isolated proteinuria without specific morphologic lesion  Noted with recent urine microalbumin.  Patient currently on lisinopril.  I have referred the patient to nephrology for further evaluation.    Renal insufficiency  Recent lab test showed low GFR  Recommend to repeat BUN/creatinine today.  Renal ultrasound requested.  Also refer the patient to nephrology for further evaluation    Results for ELROY OBREGON (MRN [id number]) as of 8/2/2021 09:05   Ref. Range 6/9/2021 04:13   Sodium Latest Ref Range: 134 - 144 mmol/L 137   Potassium Latest Ref Range: 3.5 - 5.2 mmol/L 3.7   Chloride Latest Ref Range: 96 - 106 mmol/L 95 (L)   Co2 Latest  Ref Range: 20 - 29 mmol/L 22   Glucose Latest Ref Range: 65 - 99 mg/dL 231 (H)   Bun Latest Ref Range: 8 - 27 mg/dL 21   Creatinine Latest Ref Range: 0.76 - 1.27 mg/dL 1.50 (H)   GFR If  Latest Ref Range: >59 mL/min/1.73 53 (L)   GFR If Non  Latest Ref Range: >59 mL/min/1.73 46 (L)   Bun-Creatinine Ratio Latest Ref Range: 10 - 24  14   Calcium Latest Ref Range: 8.6 - 10.2 mg/dL 9.2   ALT(SGPT) Latest Ref Range: 0 - 44 IU/L 15   Glycohemoglobin Latest Ref Range: 4.8 - 5.6 % 9.5 (H)   Cholesterol,Tot Latest Ref Range: 100 - 199 mg/dL 160   Triglycerides Latest Ref Range: 0 - 149 mg/dL 163 (H)   HDL Latest Ref Range: >39 mg/dL 36 (L)   LDL Chol Calc (NIH) Latest Ref Range: 0 - 99 mg/dL 95   VLDL Cholesterol Calc Latest Ref Range: 5 - 40 mg/dL 29   Creatinine, Random Urine Latest Ref Range: Not Estab. mg/dL 18.3   Microalbumin, Urine Random Latest Ref Range: Not Estab. ug/mL 511.3   Microalbumin-Creatinine Latest Ref Range: 0 - 29 mg/g creat 2,794 (H)   TSH Latest Ref Range: 0.450 - 4.500 uIU/mL 3.280   Hepatitis C Antibody Latest Ref Range: 0.0 - 0.9 s/co ratio <0.1       Morbid obesity (HCC)  This is chronic condition.   Pt is aware of elevated BMI.  Brief discussion with the patient regarding diet, exercise, and lifestyle modification to help achieve and maintain healthy weight              REVIEW OF SYSTEMS:     Constitutional:  no fever / chills   Neurologic: no headaches  Eyes: no changes in vision  ENT: no sore throat, no hearing loss  CV:  no chest pain, no palpitations  Pulmonary: no SOB, no cough          Allergies: Patient has no known allergies.    Current Outpatient Medications Ordered in Epic   Medication Sig Dispense Refill   • metFORMIN (GLUCOPHAGE) 500 MG Tab Take 1 tablet by mouth 2 times a day with meals. 180 tablet 3   • TOUJEO SOLOSTAR 300 UNIT/ML Solution Pen-injector Inject 35 Units under the skin every day for 90 days. 4 Each 5   • ELIQUIS 5 MG Tab Take 1 tablet by  "mouth 2 times a day. 60 tablet 6   • atorvastatin (LIPITOR) 40 MG Tab Take 1 tablet by mouth every day. 90 tablet 3   • lisinopril-hydrochlorothiazide (PRINZIDE) 20-25 MG per tablet Take 1 tablet by mouth every day. 90 tablet 3   • furosemide (LASIX) 20 MG Tab Take 1 tablet by mouth 1 time a day as needed. 30 tablet 5   • aspirin EC (ECOTRIN) 81 MG Tablet Delayed Response Take 81 mg by mouth every day.     • Insulin Pen Needle 32 G x 4 mm For Toujeo BD  Sterile needles  0.23 x 4mm  32Gx 5/32 100 Each 6     No current Epic-ordered facility-administered medications on file.       Past Medical, Social, and Family history reviewed and updated in EPIC     ------------------------------------------------------------------------------     PHYSICAL EXAM:   Vitals:    08/02/21 0848   BP: 136/84   Pulse: 92   Resp: 18   Temp: 36.6 °C (97.8 °F)   SpO2: 96%        Vitals:    08/02/21 0848   BP: 136/84   Weight: (!) 132 kg (291 lb)   Height: 1.803 m (5' 11\")         Body mass index is 40.59 kg/m².    Constitutional: no acute distress  CV: heart irregularly irregular  Resp: normal effort, no wheezing or rales.  GI: abdomen soft, no obvious mass, no tenderness  Neuro: CN 2-12 grossly intact        -----------------------------------------------------------------------------    ASSESSMENT:   1. Diabetic polyneuropathy associated with type 2 diabetes mellitus (HCC)     2. Type 2 diabetes mellitus with hyperglycemia, with long-term current use of insulin ([facility])  REFERRAL TO PHARMACOTHERAPY SERVICE   3. Renal insufficiency  REFERRAL TO NEPHROLOGY    US-RENAL    Renal Function Panel   4. Isolated proteinuria without specific morphologic lesion  REFERRAL TO NEPHROLOGY   5. Atrial fibrillation, unspecified type (HCC)     6. Insulin long-term use (HCC)     7. History of stroke     8. Hyperlipidemia due to type 2 diabetes mellitus (HCC)     9. Hypertension associated with diabetes (HCC)     10. Morbid obesity (HCC)             MEDICAL " DECISION MAKING: DISCUSSION / STATUS / PLAN:    Diabetic neuropathy associate with hypoglycemia.  Recent A1c is high at 9.5%.  Recommended for the patient to increase the Toujeo from 30 units daily to 35 units daily.  Continue with Metformin.  Stressed importance of diet and exercise and advised the patient to lose weight.  Refer the patient to pharmacotherapy for intervention.  As above the patient already had the retinal examination approximately 2 weeks ago performed by by his  doctor    Renal insufficiency/proteinuria.  Renal ultrasound requested and the patient was referred to nephrology.  Also recommend to repeat BUN/creatinine today.    Atrial fibrillation.  Chronic condition.  Continue Eliquis.  Advised the patient continue follow-up with cardiologist.    Hyperlipidemia.  Continue with atorvastatin    Essential hypertension.  BP stable.  Continue with current management.    Morbid obesity.  As above recommend diet exercise patient will try to lose weight.    Health maintenance.  Recommend the patient to get Covid vaccine.  Patient is still undecided     Return in about 6 months (around 2/2/2022) for Annual Wellness exam.     -If any problems should arise, patient was advised to contact our office or go to ER to be evaluated.    Please note that this dictation was created using voice recognition software. I have made every reasonable attempt to correct obvious errors, but it is possible there are errors of grammar and possibly content that I did not discover before finalizing the note.

## 2021-08-02 NOTE — ASSESSMENT & PLAN NOTE
Recent lab test showed low GFR  Recommend to repeat BUN/creatinine today.  Renal ultrasound requested.  Also refer the patient to nephrology for further evaluation    Results for ELROY OBREGON (MRN [id number]) as of 8/2/2021 09:05   Ref. Range 6/9/2021 04:13   Sodium Latest Ref Range: 134 - 144 mmol/L 137   Potassium Latest Ref Range: 3.5 - 5.2 mmol/L 3.7   Chloride Latest Ref Range: 96 - 106 mmol/L 95 (L)   Co2 Latest Ref Range: 20 - 29 mmol/L 22   Glucose Latest Ref Range: 65 - 99 mg/dL 231 (H)   Bun Latest Ref Range: 8 - 27 mg/dL 21   Creatinine Latest Ref Range: 0.76 - 1.27 mg/dL 1.50 (H)   GFR If  Latest Ref Range: >59 mL/min/1.73 53 (L)   GFR If Non  Latest Ref Range: >59 mL/min/1.73 46 (L)   Bun-Creatinine Ratio Latest Ref Range: 10 - 24  14   Calcium Latest Ref Range: 8.6 - 10.2 mg/dL 9.2   ALT(SGPT) Latest Ref Range: 0 - 44 IU/L 15   Glycohemoglobin Latest Ref Range: 4.8 - 5.6 % 9.5 (H)   Cholesterol,Tot Latest Ref Range: 100 - 199 mg/dL 160   Triglycerides Latest Ref Range: 0 - 149 mg/dL 163 (H)   HDL Latest Ref Range: >39 mg/dL 36 (L)   LDL Chol Calc (NIH) Latest Ref Range: 0 - 99 mg/dL 95   VLDL Cholesterol Calc Latest Ref Range: 5 - 40 mg/dL 29   Creatinine, Random Urine Latest Ref Range: Not Estab. mg/dL 18.3   Microalbumin, Urine Random Latest Ref Range: Not Estab. ug/mL 511.3   Microalbumin-Creatinine Latest Ref Range: 0 - 29 mg/g creat 2,794 (H)   TSH Latest Ref Range: 0.450 - 4.500 uIU/mL 3.280   Hepatitis C Antibody Latest Ref Range: 0.0 - 0.9 s/co ratio <0.1

## 2021-08-02 NOTE — ASSESSMENT & PLAN NOTE
Patient reported prior stroke history.  He is currently taking aspirin 81 mg daily.  Patient also on Eliquis twice a day as recommended by his cardiologist.  No history of bleeding or any new neurologic symptoms noted.

## 2021-08-06 ENCOUNTER — TELEPHONE (OUTPATIENT)
Dept: VASCULAR LAB | Facility: MEDICAL CENTER | Age: 71
End: 2021-08-06

## 2021-08-06 NOTE — TELEPHONE ENCOUNTER
Left voicemail message for patient to return call to [facility] to establish care      BHAVANI Samaniego, Clinical Pharmacist, CDE, CACP

## 2021-08-12 ENCOUNTER — DOCUMENTATION (OUTPATIENT)
Dept: VASCULAR LAB | Facility: MEDICAL CENTER | Age: 71
End: 2021-08-12

## 2021-08-12 NOTE — PROGRESS NOTES
[facility] Pharmacotherapy [facility]    Received DM referral from Dr. Mazariegos on 8/2/21    Called pt to schedule appt to establish care - no answer. Unable to LVM - will c/b at a later time.    Insurance: Leeanne RUVALCABA  PCP: [facility]  Locations to be seen: [facility] Anticoagulation/Pharmacotherapy Clinic at [phone], fax [phone]    Kym WardD

## 2021-08-16 ENCOUNTER — DOCUMENTATION (OUTPATIENT)
Dept: VASCULAR LAB | Facility: MEDICAL CENTER | Age: 71
End: 2021-08-16

## 2021-08-16 NOTE — PROGRESS NOTES
[facility]otherapy [facility]     Received DM referral from Dr. Mazariegos on 8/2/21     2nd call  LVM to establish care     Insurance: Leeanne RUVALCABA  PCP: [facility]  Locations to be seen: [facility] Anticoagulation/Pharmacotherapy Clinic at [phone], fax [phone]     Maggy Valdivia, KymD

## 2021-08-23 ENCOUNTER — DOCUMENTATION (OUTPATIENT)
Dept: VASCULAR LAB | Facility: MEDICAL CENTER | Age: 71
End: 2021-08-23

## 2021-08-23 NOTE — PROGRESS NOTES
[facility]     Received DM referral from Dr. Mazariegos on 8/2/21     3rd call  LVM to establish care  Will send letter     Insurance: Leeanne RUVALCABA  PCP: [facility]  Locations to be seen: [facility] Anticoagulation/Pharmacotherapy Clinic at [phone], fax [phone]     Maggy Valdivia, PharmD

## 2021-08-23 NOTE — PROGRESS NOTES
[facility] Pharmacotherapy [facility]     Received DM referral from Dr. Mazariegos on 8/2/21     3rd call  LVM to establish care and requested the patient call the clinic back to schedule NP appt.      Insurance: Leeanne RUVALCABA  PCP: [facility]  Locations to be seen: [facility] Anticoagulation/Pharmacotherapy Clinic at [phone], fax [phone]      Nahum MejíaD

## 2021-08-23 NOTE — LETTER
[address]  [facility] [address]        Dear Bennett Callahan ,    We have been unsuccessful in our attempts to contact you regarding your Diabetes Clinical Pharmacist referral.  Please contact us if you would like to schedule an appointment for help managing your diabetes.    Please contact our clinic so we may assist you.  We are open Monday-Friday 8 am until 5 pm.  You may reach our Service at [phone].        Sincerely,    Kym MeyerD, BCPS  Clinic Supervisor  [facility]  Outpatient Anticoagulation Service

## 2021-08-30 ENCOUNTER — DOCUMENTATION (OUTPATIENT)
Dept: VASCULAR LAB | Facility: MEDICAL CENTER | Age: 71
End: 2021-08-30

## 2021-08-30 NOTE — PROGRESS NOTES
[facility]otherapy [facility]     Received DM referral from Dr. Mazariegos on 8/2/21     S/w pt - he is not yet ready to schedule an appt w/ the DM pharmacotherapy clinic. He asks for c/b in 2 weeks time.     Insurance: Leeanne RUVALCABA  PCP: [facility]  Locations to be seen: [facility] Anticoagulation/Pharmacotherapy Clinic at [phone], fax [phone]     Alex Singh, KymD

## 2021-09-13 ENCOUNTER — DOCUMENTATION (OUTPATIENT)
Dept: VASCULAR LAB | Facility: MEDICAL CENTER | Age: 71
End: 2021-09-13

## 2021-09-13 NOTE — PROGRESS NOTES
[facility] Pharmacotherapy [facility]     Received DM referral from Dr. Mazariegos on 8/2/21     S/w pt - he is not yet ready to schedule an appt w/ the DM pharmacotherapy clinic. He asks for c/b in 2 weeks time.    Called pt to f/u on the above - no answer. LVM.     Insurance: Leeanne RUVALCABA  PCP: [facility]  Locations to be seen: [facility] Anticoagulation/Pharmacotherapy Clinic at [phone], fax [phone]     Kym WardD

## 2021-09-20 ENCOUNTER — DOCUMENTATION (OUTPATIENT)
Dept: VASCULAR LAB | Facility: MEDICAL CENTER | Age: 71
End: 2021-09-20

## 2021-09-20 NOTE — PROGRESS NOTES
[facility]     Received DM referral from Dr. Mazariegos on 8/2/21     S/w pt - he is not yet ready to schedule an appt w/ the DM pharmacotherapy clinic. He asks for c/b in 2 weeks time.     Called pt to f/u on the above - no answer. Left second voicemail.     Insurance: Leeanne RUVALCABA  PCP: [facility]  Locations to be seen: [facility] Anticoagulation/Pharmacotherapy Clinic at [phone], fax [phone]     Lukas Pollard, KymD

## 2021-09-27 ENCOUNTER — DOCUMENTATION (OUTPATIENT)
Dept: VASCULAR LAB | Facility: MEDICAL CENTER | Age: 71
End: 2021-09-27

## 2021-09-27 NOTE — Clinical Note
Alvaro Mazariegos,  Just FYI- you referred this patient to our outpatient DM pharmacotherapy clinic on 8/2/21, and we have made several attempts to establish care with him.   We will await for a call back from the patient to establish care.   Thank you.

## 2021-09-27 NOTE — PROGRESS NOTES
"[facility] Pharmacotherapy [facility]     Received DM referral from Dr. Mazariegos on 8/2/21     Per previous note: \"S/w pt - he is not yet ready to schedule an appt w/ the DM pharmacotherapy clinic. He asks for c/b in 2 weeks time\".     Called pt to f/u on the above - no answer. Left another voicemail. Requested the patient call the clinic back to establish care.     Of note: this is the 8th call in attempt to establish care with patient since receiving referral.   Will await contact from the patient.   Will inform referring provider of patient's reluctance.       Insurance: Leeanne RUVALCABA  PCP: [facility]  Locations to be seen: [facility] Anticoagulation/Pharmacotherapy Clinic at [phone], fax [phone]       Nahum Pokl  PharmD    "

## 2021-09-27 NOTE — LETTER
October 4, 2021        Bennett Callahan        This letter is in regards to a referral Dr Mazariegos sent to our pharmacotherapy clinic.  Dr Mazariegos thinks this clinic can be very beneficial for you and will help optimize the medications you are taking.    The clinic states that you have been reluctant to schedule an appointment.  You can call them at [phone] to schedule an appointment.  If you are still not sure you want to see them, you can discuss with Dr Mazariegos at your upcoming appointment on 10/14/2021.      Thank you                        Kitty Wick, Med Ass't

## 2021-10-28 ENCOUNTER — HOSPITAL ENCOUNTER (OUTPATIENT)
Dept: LAB | Facility: MEDICAL CENTER | Age: 71
End: 2021-10-28
Attending: INTERNAL MEDICINE
Payer: MEDICARE

## 2021-10-28 ENCOUNTER — OFFICE VISIT (OUTPATIENT)
Dept: MEDICAL GROUP | Facility: PHYSICIAN GROUP | Age: 71
End: 2021-10-28
Payer: MEDICARE

## 2021-10-28 VITALS
RESPIRATION RATE: 16 BRPM | BODY MASS INDEX: 42 KG/M2 | DIASTOLIC BLOOD PRESSURE: 84 MMHG | OXYGEN SATURATION: 97 % | SYSTOLIC BLOOD PRESSURE: 136 MMHG | WEIGHT: 300 LBS | HEIGHT: 71 IN | HEART RATE: 85 BPM | TEMPERATURE: 98.1 F

## 2021-10-28 DIAGNOSIS — E11.42 DIABETIC POLYNEUROPATHY ASSOCIATED WITH TYPE 2 DIABETES MELLITUS (HCC): ICD-10-CM

## 2021-10-28 DIAGNOSIS — E78.5 HYPERLIPIDEMIA DUE TO TYPE 2 DIABETES MELLITUS (HCC): ICD-10-CM

## 2021-10-28 DIAGNOSIS — E66.01 MORBID OBESITY (HCC): ICD-10-CM

## 2021-10-28 DIAGNOSIS — R80.0 ISOLATED PROTEINURIA WITHOUT SPECIFIC MORPHOLOGIC LESION: ICD-10-CM

## 2021-10-28 DIAGNOSIS — E11.69 HYPERLIPIDEMIA DUE TO TYPE 2 DIABETES MELLITUS (HCC): ICD-10-CM

## 2021-10-28 DIAGNOSIS — I48.91 ATRIAL FIBRILLATION, UNSPECIFIED TYPE (HCC): ICD-10-CM

## 2021-10-28 DIAGNOSIS — I15.2 HYPERTENSION ASSOCIATED WITH DIABETES (HCC): ICD-10-CM

## 2021-10-28 DIAGNOSIS — E11.59 HYPERTENSION ASSOCIATED WITH DIABETES (HCC): ICD-10-CM

## 2021-10-28 DIAGNOSIS — Z79.4 INSULIN LONG-TERM USE (HCC): ICD-10-CM

## 2021-10-28 LAB
ALT SERPL-CCNC: 21 U/L (ref 2–50)
ANION GAP SERPL CALC-SCNC: 9 MMOL/L (ref 7–16)
BUN SERPL-MCNC: 25 MG/DL (ref 8–22)
CALCIUM SERPL-MCNC: 9.2 MG/DL (ref 8.5–10.5)
CHLORIDE SERPL-SCNC: 100 MMOL/L (ref 96–112)
CHOLEST SERPL-MCNC: 151 MG/DL (ref 100–199)
CO2 SERPL-SCNC: 29 MMOL/L (ref 20–33)
CREAT SERPL-MCNC: 1.54 MG/DL (ref 0.5–1.4)
EST. AVERAGE GLUCOSE BLD GHB EST-MCNC: 171 MG/DL
FASTING STATUS PATIENT QL REPORTED: NORMAL
GLUCOSE SERPL-MCNC: 158 MG/DL (ref 65–99)
HBA1C MFR BLD: 7.6 % (ref 4–5.6)
HDLC SERPL-MCNC: 40 MG/DL
LDLC SERPL CALC-MCNC: 90 MG/DL
POTASSIUM SERPL-SCNC: 4.3 MMOL/L (ref 3.6–5.5)
SODIUM SERPL-SCNC: 138 MMOL/L (ref 135–145)
TRIGL SERPL-MCNC: 103 MG/DL (ref 0–149)

## 2021-10-28 PROCEDURE — 80048 BASIC METABOLIC PNL TOTAL CA: CPT

## 2021-10-28 PROCEDURE — 99214 OFFICE O/P EST MOD 30 MIN: CPT | Performed by: INTERNAL MEDICINE

## 2021-10-28 PROCEDURE — 80061 LIPID PANEL: CPT

## 2021-10-28 PROCEDURE — 83036 HEMOGLOBIN GLYCOSYLATED A1C: CPT | Mod: GA

## 2021-10-28 PROCEDURE — 84460 ALANINE AMINO (ALT) (SGPT): CPT

## 2021-10-28 PROCEDURE — 36415 COLL VENOUS BLD VENIPUNCTURE: CPT

## 2021-10-28 RX ORDER — INSULIN GLARGINE 300 U/ML
40 INJECTION, SOLUTION SUBCUTANEOUS DAILY
Qty: 5 EACH | Refills: 11 | Status: SHIPPED | OUTPATIENT
Start: 2021-10-28 | End: 2022-01-26

## 2021-10-28 NOTE — PROGRESS NOTES
CC: Follow-up diabetes  Follow-up hypertension      HPI: This is a 71 y.o. pt.  Pt's medical history is notable for:     Atrial fibrillation (HCC)  This is a chronic condition.  The patient followed by cardiologist at [facility].  Patient is presently taking Eliquis.  He denies any history of bleeding.  Patient denied chest pain palpitation or dizziness.    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  The patient currently on insulin treatment Toujeo 40 units daily.  Patient denies significant hypoglycemic symptoms.  Patient is due for lab test.    Hyperlipidemia due to type 2 diabetes mellitus (HCC)  Chronic condition.  The patient is being treated with atorvastatin.  Lab tests ordered for follow-up.    Hypertension associated with diabetes (HCC)  Chronic condition.  The patient is currently taking lisinopril/hydrochlorothiazide daily.  Patient reported that his blood pressure has been well controlled.    Insulin long-term use (HCC)  Patient denies any side effects or problems using the insulin treatment.    Morbid obesity (HCC)  This is chronic condition.   Pt is aware of elevated BMI.  Brief discussion with the patient regarding diet, exercise, and lifestyle modification to help achieve and maintain healthy weight        Isolated proteinuria without specific morphologic lesion  Noted with previous urine test.  24-hour urine tests ordered today.          REVIEW OF SYSTEMS:     Constitutional:  no fever / chills   Eyes: no changes in vision  ENT: no sore throat, no hearing loss  CV:  no chest pain, no palpitations  Pulmonary: no SOB, no cough          Allergies: Patient has no known allergies.    Current Outpatient Medications Ordered in Epic   Medication Sig Dispense Refill   • metFORMIN (GLUCOPHAGE) 500 MG Tab Take 1 Tablet by mouth 2 times a day with meals. 180 Tablet 3   • TOUJEO SOLOSTAR 300 UNIT/ML Solution Pen-injector Inject 40 Units under the skin  "every day for 90 days. 5 Each 11   • ELIQUIS 5 MG Tab Take 1 tablet by mouth 2 times a day. 60 tablet 6   • atorvastatin (LIPITOR) 40 MG Tab Take 1 tablet by mouth every day. 90 tablet 3   • lisinopril-hydrochlorothiazide (PRINZIDE) 20-25 MG per tablet Take 1 tablet by mouth every day. 90 tablet 3   • furosemide (LASIX) 20 MG Tab Take 1 tablet by mouth 1 time a day as needed. 30 tablet 5   • aspirin EC (ECOTRIN) 81 MG Tablet Delayed Response Take 81 mg by mouth every day.     • Insulin Pen Needle 32 G x 4 mm For Toujeo BD  Sterile needles  0.23 x 4mm  32Gx 5/32 100 Each 6     No current Epic-ordered facility-administered medications on file.       Past Medical, Social, and Family history reviewed and updated in EPIC     ------------------------------------------------------------------------------     PHYSICAL EXAM:   Vitals:    10/28/21 0949   BP: 136/84   Pulse: 85   Resp: 16   Temp: 36.7 °C (98.1 °F)   SpO2: 97%        Vitals:    10/28/21 0949   BP: 136/84   Weight: (!) 136 kg (300 lb)   Height: 1.803 m (5' 11\")         Body mass index is 41.84 kg/m².    Constitutional: no acute distress  CV: heart irregularly irregular  Resp: normal effort, no wheezing or rales.  GI: abdomen soft, no obvious mass, no tenderness  Neuro: CN 2-12 grossly intact        -----------------------------------------------------------------------------    ASSESSMENT:   1. Diabetic polyneuropathy associated with type 2 diabetes mellitus (HCC)  Basic Metabolic Panel    HEMOGLOBIN A1C   2. Hyperlipidemia due to type 2 diabetes mellitus (HCC)  ALANINE AMINO-TRANS    Lipid Profile   3. Atrial fibrillation, unspecified type (HCC)     4. Isolated proteinuria without specific morphologic lesion  URINETOTAL PROTEIN 24 HR   5. Hypertension associated with diabetes (HCC)     6. Insulin long-term use (HCC)     7. Morbid obesity (HCC)             MEDICAL DECISION MAKING: DISCUSSION / STATUS / PLAN:    Diabetic neuropathy.  Current control is " unclear.  Lab tests ordered.  A1c goal of 7% discussed.  Pt's education:   -Advised the  benefits of blood glucose monitoring, potential hypoglycemia , medication mode of action/ possible side effects, the effects of exercise, potential acute and chronic conditions related to diabetes.   -Discussed with pt regarding changing diet and making better choices to help  blood sugar.  -Also encouraged pt to continue with regular exercises/walking.    Essential hypertension.  BP stable.  Continue current management.    Dyslipidemia.  Lipid panel ordered.  Continue atorvastatin.    Atrial fibrillation.  Chronic condition.  Continue with Eliquis.  Continue follow-up with cardiologist as directed.    Proteinuria.  24-hour urine protein requested recommend low protein diet.    Obesity.  Advised the patient regarding diet and exercise and he will try to lose weight.    Health maintenance.  Patient reported that he completed the Covid vaccine.  Patient will bring in the car for documentation/update.  Discussed with the patient regarding shingle vaccine Tdap influenza vaccine and pneumococcal vaccine.  The patient declined.  The patient will go to local pharmacy as he is a Medicare recipient    Commend follow-up 4 months         -If any problems should arise, patient was advised to contact our office for followup or go to ER to be evaluated.    Please note that this dictation was created using voice recognition software. I have made every reasonable attempt to correct obvious errors, but it is possible there are errors of grammar and possibly content that I did not discover before finalizing the note.

## 2021-10-28 NOTE — ASSESSMENT & PLAN NOTE
Chronic condition.  The patient currently on insulin treatment Toujeo 40 units daily.  Patient denies significant hypoglycemic symptoms.  Patient is due for lab test.

## 2021-10-28 NOTE — ASSESSMENT & PLAN NOTE
This is a chronic condition.  The patient followed by cardiologist at [facility].  Patient is presently taking Eliquis.  He denies any history of bleeding.  Patient denied chest pain palpitation or dizziness.

## 2021-10-28 NOTE — ASSESSMENT & PLAN NOTE
Chronic condition.  The patient is currently taking lisinopril/hydrochlorothiazide daily.  Patient reported that his blood pressure has been well controlled.

## 2021-10-31 DIAGNOSIS — N18.31 STAGE 3A CHRONIC KIDNEY DISEASE: ICD-10-CM

## 2021-10-31 PROBLEM — N18.30 CKD (CHRONIC KIDNEY DISEASE) STAGE 3, GFR 30-59 ML/MIN: Status: ACTIVE | Noted: 2021-08-02

## 2021-11-02 ENCOUNTER — TELEPHONE (OUTPATIENT)
Dept: MEDICAL GROUP | Facility: PHYSICIAN GROUP | Age: 71
End: 2021-11-02

## 2021-11-02 NOTE — TELEPHONE ENCOUNTER
----- Message from Jonathan Mazariegos M.D. sent at 10/31/2021 12:05 PM PDT -----  Please notify patient about their result(s):    Blood tests showed reduced kidney function. This was noted before. Stable.  Please avoid taking medications such as ibuprofen, aleve, advil, motrin etc..[commonly known as NSAIDS]  Renal US ordered.  Please call [facility] [phone] to schedule the appointment.    Please f.u w dr Mazariegos after US done.    A1c [diabetes test]   7.6 %   : much improved when compared to previous result. However, we are not yet at goal which is 7%.  Please continue with low sweet  low carb diet ,  continue to exercise / walking regularly.  Rec pt to increase the Toujeo solostar insulin from 40units to 42 units. Please also continue with other meds

## 2021-11-02 NOTE — TELEPHONE ENCOUNTER
1st attempt to contact patient.  Left voicemail in regards to results.  Asked patient to return the call by contacting the office at [phone].

## 2021-11-04 ENCOUNTER — TELEPHONE (OUTPATIENT)
Dept: MEDICAL GROUP | Facility: PHYSICIAN GROUP | Age: 71
End: 2021-11-04

## 2021-11-05 ENCOUNTER — HOSPITAL ENCOUNTER (OUTPATIENT)
Facility: MEDICAL CENTER | Age: 71
End: 2021-11-05
Attending: INTERNAL MEDICINE
Payer: MEDICARE

## 2021-11-05 DIAGNOSIS — R80.0 ISOLATED PROTEINURIA WITHOUT SPECIFIC MORPHOLOGIC LESION: ICD-10-CM

## 2021-11-05 LAB
PROT 24H UR-MCNC: 4826 MG/24 HR (ref 30–150)
PROT 24H UR-MRATE: 254 MG/DL (ref 0–15)
SPECIMEN VOL UR: 1900 ML

## 2021-11-05 PROCEDURE — 81050 URINALYSIS VOLUME MEASURE: CPT

## 2021-11-05 PROCEDURE — 84156 ASSAY OF PROTEIN URINE: CPT

## 2021-11-06 DIAGNOSIS — R80.0 ISOLATED PROTEINURIA WITHOUT SPECIFIC MORPHOLOGIC LESION: Primary | ICD-10-CM

## 2021-11-06 DIAGNOSIS — E11.42 DIABETIC POLYNEUROPATHY ASSOCIATED WITH TYPE 2 DIABETES MELLITUS (HCC): ICD-10-CM

## 2021-11-08 ENCOUNTER — TELEPHONE (OUTPATIENT)
Dept: MEDICAL GROUP | Facility: PHYSICIAN GROUP | Age: 71
End: 2021-11-08

## 2021-11-08 NOTE — TELEPHONE ENCOUNTER
1st attempt to contact patient.  Left voicemail in regards to results.  Asked patient to return the call by contacting the office at [phone].

## 2021-11-08 NOTE — TELEPHONE ENCOUNTER
----- Message from Jonathan Mazariegos M.D. sent at 11/6/2021 10:00 AM PDT -----  Please notify patient about their result(s):    24hr urine test showed very high protein level    Recommendation: A referral has been submitted to NEPHROLOGY  for further evaluation / treatment.    If you do not hear from the Referrals Department for scheduling, please call [phone] to schedule.     IN ADDition, kidney US also requested  Please call [facility] [phone] to schedule the appointment.   Rec pt to bettie sloan pcp after US done.

## 2021-11-08 NOTE — TELEPHONE ENCOUNTER
2nd attempt to contact patient.  Left voicemail in regards to results.  Asked patient to return the call by contacting the office at [phone].

## 2021-11-08 NOTE — TELEPHONE ENCOUNTER
----- Message from Jonathan Mazariegos M.D. sent at 11/6/2021 10:00 AM PDT -----  Please notify patient about their result(s):    24hr urine test showed very high protein level    Recommendation: A referral has been submitted to NEPHROLOGY  for further evaluation / treatment.    If you do not hear from the Referrals Department for scheduling, please call [phone] to schedule.     IN ADDition, kidney US also requested  Please call [facility] [phone] to schedule the appointment.   Rec pt to bettie sloan pcp after US done.

## 2021-11-09 NOTE — TELEPHONE ENCOUNTER
Patient notified of test results and he will schedule US appointment and follow up with PCP after.

## 2022-03-24 DIAGNOSIS — E11.59 HYPERTENSION ASSOCIATED WITH DIABETES (HCC): ICD-10-CM

## 2022-03-24 DIAGNOSIS — E11.69 HYPERLIPIDEMIA DUE TO TYPE 2 DIABETES MELLITUS (HCC): ICD-10-CM

## 2022-03-24 DIAGNOSIS — E11.42 DIABETIC POLYNEUROPATHY ASSOCIATED WITH TYPE 2 DIABETES MELLITUS (HCC): ICD-10-CM

## 2022-03-24 DIAGNOSIS — E78.5 HYPERLIPIDEMIA DUE TO TYPE 2 DIABETES MELLITUS (HCC): ICD-10-CM

## 2022-03-24 DIAGNOSIS — I15.2 HYPERTENSION ASSOCIATED WITH DIABETES (HCC): ICD-10-CM

## 2022-03-24 RX ORDER — FUROSEMIDE 20 MG/1
20 TABLET ORAL
Qty: 30 TABLET | Refills: 5 | Status: SHIPPED | OUTPATIENT
Start: 2022-03-24 | End: 2022-11-17

## 2022-03-24 NOTE — TELEPHONE ENCOUNTER
VOICEMAIL  1. Caller Name: Harish Callahan                      Call Back Number: [phone]      2. Message: pt called and LVm requesting a refill on the furosemide.    3. Patient approves office to leave a detailed voicemail/MyChart message: N\A

## 2022-06-16 RX ORDER — PEN NEEDLE, DIABETIC 32 GX 1/4"
NEEDLE, DISPOSABLE MISCELLANEOUS
Qty: 200 EACH | Refills: 4 | Status: SHIPPED | OUTPATIENT
Start: 2022-06-16 | End: 2023-06-27

## 2022-07-18 ENCOUNTER — HOSPITAL ENCOUNTER (OUTPATIENT)
Facility: MEDICAL CENTER | Age: 72
End: 2022-07-18
Attending: INTERNAL MEDICINE
Payer: MEDICARE

## 2022-07-18 ENCOUNTER — OFFICE VISIT (OUTPATIENT)
Dept: MEDICAL GROUP | Facility: PHYSICIAN GROUP | Age: 72
End: 2022-07-18
Payer: MEDICARE

## 2022-07-18 VITALS
RESPIRATION RATE: 18 BRPM | HEIGHT: 71 IN | WEIGHT: 296 LBS | HEART RATE: 91 BPM | BODY MASS INDEX: 41.44 KG/M2 | DIASTOLIC BLOOD PRESSURE: 84 MMHG | OXYGEN SATURATION: 96 % | SYSTOLIC BLOOD PRESSURE: 136 MMHG | TEMPERATURE: 98.6 F

## 2022-07-18 DIAGNOSIS — E11.59 HYPERTENSION ASSOCIATED WITH DIABETES (HCC): ICD-10-CM

## 2022-07-18 DIAGNOSIS — E11.42 DIABETIC POLYNEUROPATHY ASSOCIATED WITH TYPE 2 DIABETES MELLITUS (HCC): ICD-10-CM

## 2022-07-18 DIAGNOSIS — Z79.4 INSULIN LONG-TERM USE (HCC): ICD-10-CM

## 2022-07-18 DIAGNOSIS — E78.5 HYPERLIPIDEMIA DUE TO TYPE 2 DIABETES MELLITUS (HCC): ICD-10-CM

## 2022-07-18 DIAGNOSIS — N18.31 STAGE 3A CHRONIC KIDNEY DISEASE: ICD-10-CM

## 2022-07-18 DIAGNOSIS — I15.2 HYPERTENSION ASSOCIATED WITH DIABETES (HCC): ICD-10-CM

## 2022-07-18 DIAGNOSIS — S81.801A WOUND OF RIGHT LEG, INITIAL ENCOUNTER: ICD-10-CM

## 2022-07-18 DIAGNOSIS — I48.91 ATRIAL FIBRILLATION, UNSPECIFIED TYPE (HCC): ICD-10-CM

## 2022-07-18 DIAGNOSIS — E11.69 HYPERLIPIDEMIA DUE TO TYPE 2 DIABETES MELLITUS (HCC): ICD-10-CM

## 2022-07-18 PROBLEM — N18.30 CKD (CHRONIC KIDNEY DISEASE) STAGE 3, GFR 30-59 ML/MIN: Chronic | Status: ACTIVE | Noted: 2021-08-02

## 2022-07-18 LAB
HBA1C MFR BLD: 6.7 % (ref 0–5.6)
INT CON NEG: NEGATIVE
INT CON POS: POSITIVE

## 2022-07-18 PROCEDURE — 87205 SMEAR GRAM STAIN: CPT

## 2022-07-18 PROCEDURE — 83036 HEMOGLOBIN GLYCOSYLATED A1C: CPT | Performed by: INTERNAL MEDICINE

## 2022-07-18 PROCEDURE — 99214 OFFICE O/P EST MOD 30 MIN: CPT | Performed by: INTERNAL MEDICINE

## 2022-07-18 PROCEDURE — 87070 CULTURE OTHR SPECIMN AEROBIC: CPT

## 2022-07-18 RX ORDER — CEPHALEXIN 500 MG/1
500 CAPSULE ORAL 4 TIMES DAILY
Qty: 40 CAPSULE | Refills: 0 | Status: SHIPPED | OUTPATIENT
Start: 2022-07-18 | End: 2023-09-14

## 2022-07-18 RX ORDER — INSULIN GLARGINE 300 U/ML
INJECTION, SOLUTION SUBCUTANEOUS
COMMUNITY
Start: 2022-07-06 | End: 2022-11-17

## 2022-07-18 ASSESSMENT — PATIENT HEALTH QUESTIONNAIRE - PHQ9: CLINICAL INTERPRETATION OF PHQ2 SCORE: 0

## 2022-07-18 NOTE — ASSESSMENT & PLAN NOTE
This is a new condition.  The patient stated that he reported a nonhealing wound on the right lower leg noted since last couple of weeks.  Patient reported that he was wearing boots without socks.  Patient denies fever or chills.  He has been applying topical antibiotic ointment without improvement.

## 2022-07-18 NOTE — ASSESSMENT & PLAN NOTE
This is a chronic condition.  The patient is currently on Toujeo insulin treatment and  metformin.He denies significant hyper or hypoglycemic symptoms.  A1c in the office today 6.7%.

## 2022-07-18 NOTE — LETTER
[facility]  Jonathan Mazariegos M.D.  [address]  [facility] [address]  Fax: [phone]   Authorization for Release/Disclosure of   Protected Health Information   Name: ELROY CALLAHAN : 1950 SSN: [id number]   Address: [place] [address] Phone:    [phone] (home)    I authorize the entity listed below to release/disclose the PHI below to:   [facility]/Jonathan Mazariegos M.D. and Jonathan Mazariegos M.D.   Provider or Entity Name:     Address   City, State, [facility]   Phone:      Fax:     Reason for request: continuity of care   Information to be released:    [  ] LAST COLONOSCOPY,  including any PATH REPORT and follow-up  [  ] LAST FIT/COLOGUARD RESULT [  ] LAST DEXA  [  ] LAST MAMMOGRAM  [  ] LAST PAP  [  ] LAST LABS [ XX ] RETINA EXAM REPORT  [  ] IMMUNIZATION RECORDS  [  ] Release all info      [  ] Check here and initial the line next to each item to release ALL health information INCLUDING  _____ Care and treatment for drug and / or alcohol abuse  _____ HIV testing, infection status, or AIDS  _____ Genetic Testing    DATES OF SERVICE OR TIME PERIOD TO BE DISCLOSED: _____________  I understand and acknowledge that:  * This Authorization may be revoked at any time by you in writing, except if your health information has already been used or disclosed.  * Your health information that will be used or disclosed as a result of you signing this authorization could be re-disclosed by the recipient. If this occurs, your re-disclosed health information may no longer be protected by State or Federal laws.  * You may refuse to sign this Authorization. Your refusal will not affect your ability to obtain treatment.  * This Authorization becomes effective upon signing and will  on (date) __________.      If no date is indicated, this Authorization will  one (1) year from the signature date.    Name: Elroy Callahan    Signature:   Date:     2022       PLEASE FAX REQUESTED  RECORDS BACK TO: [phone]

## 2022-07-18 NOTE — ASSESSMENT & PLAN NOTE
Chronic condition.  Patient followed by cardiology service at [facility].  Taking aspirin and Eliquis.  He denies any history of bleeding.  Patient denies chest pain shortness of breath palpitation or near syncope.

## 2022-07-18 NOTE — PROGRESS NOTES
PRIMARY CARE CLINIC VISIT  Chief Complaint   Patient presents with   • Follow-Up     Nonhealing wound right leg  Follow-up diabetes    History of Present Illness     Wound of right leg, initial encounter  This is a new condition.  The patient stated that he reported a nonhealing wound on the right lower leg noted since last couple of weeks.  Patient reported that he was wearing boots without socks.  Patient denies fever or chills.  He has been applying topical antibiotic ointment without improvement.    Diabetic neuropathy associated with type 2 diabetes mellitus (HCC)  This is a chronic condition.  The patient is currently on Toujeo insulin treatment and  metformin.He denies significant hyper or hypoglycemic symptoms.  A1c in the office today 6.7%.    Hypertension associated with diabetes ([facility])  Chronic condition.  Patient is taking furosemide lisinopril/hydrochlorothiazide.  No significant side effects reported.    Hyperlipidemia due to type 2 diabetes mellitus ([facility])  This is a chronic condition.  The patient is taking atorvastatin.  Lipid panel ordered for follow-up.    CKD (chronic kidney disease) stage 3, GFR 30-59 ml/min ([facility])  Lab test requested for follow-up.  If his labs show GFR in the 40s.    Atrial fibrillation ([facility])  Chronic condition.  Patient followed by cardiology service at [facility].  Taking aspirin and Eliquis.  He denies any history of bleeding.  Patient denies chest pain shortness of breath palpitation or near syncope.    Insulin long-term use ([facility])  Chronic stable condition.  Patient denies significant side effects or difficulty using insulin.      Current Outpatient Medications on File Prior to Visit   Medication Sig Dispense Refill   • furosemide (LASIX) 20 MG Tab Take 1 Tablet by mouth 1 time a day as needed. 30 Tablet 5   • metFORMIN (GLUCOPHAGE) 500 MG Tab Take 1 Tablet by mouth 2 times a day with meals. 180 Tablet 3   • ELIQUIS 5 MG Tab Take 1 tablet by mouth 2 times a day.  "60 tablet 6   • atorvastatin (LIPITOR) 40 MG Tab Take 1 tablet by mouth every day. 90 tablet 3   • lisinopril-hydrochlorothiazide (PRINZIDE) 20-25 MG per tablet Take 1 tablet by mouth every day. 90 tablet 3   • aspirin EC (ECOTRIN) 81 MG Tablet Delayed Response Take 81 mg by mouth every day.     • Insulin Pen Needle 32 G x 4 mm For Toujeo BD  Sterile needles  0.23 x 4mm  32Gx 5/32 100 Each 6   • TOUJEO SOLOSTAR 300 UNIT/ML Solution Pen-injector inject 40 units subcutaneously once daily     • DROPLET PEN NEEDLES 32G X 6 MM Misc use as directed 200 Each 4   • Blood Glucose Test Strips ONE TOUCH test strips . Check blood sugar 1x per day and prn ssx of high or low sugar 200 Strip 3     No current facility-administered medications on file prior to visit.        Allergies: Patient has no known allergies.    ROS  As per HPI above. All other systems reviewed and negative.      Past Medical, Social, and Family history reviewed and updated in EPIC     Objective     /84 (BP Location: Left arm, Patient Position: Sitting, BP Cuff Size: Adult)   Pulse 91   Temp 37 °C (98.6 °F) (Temporal)   Resp 18   Ht 1.803 m (5' 11\")   Wt (!) 134 kg (296 lb)   SpO2 96%    Body mass index is 41.28 kg/m².    General: alert and oriented  Cardiovascular: regular rate and rhythm  Pulmonary: lungs : no wheezing   Gastrointestinal: BS present. No obvious mass noted  Right lower extremity  Moderate swelling noted there are superficial ulcers with minimal serous discharge noted.  Wound culture taken.  Negative Homans' sign no palpable cords noted    Monofilament testing with a 10 gram force: sensation intact: decreased bilaterally  Visual Inspection: Feet with ulcers  Pedal pulses: decreased bilaterally        Assessment and Plan     1. Diabetic polyneuropathy associated with type 2 diabetes mellitus (HCC)  - POCT Retinal Eye Exam  - POCT A1C  A1c in the office today 6.7%.  Advised the patient continue with current management   A1c goal of " 7% discussed.  Pt's education:   -Advised the  benefits of blood glucose monitoring, potential hypoglycemia , medication mode of action/ possible side effects, the effects of exercise, potential acute and chronic conditions related to diabetes.   -Discussed with pt regarding changing diet and making better choices to help  blood sugar.  -Also encouraged pt to continue with regular exercises/walking.          2. Wound of right leg, initial encounter  - Referral to Wound Clinic, urgent request    - CULTURE WOUND W/ GRAM STAIN; Future  - US-EXTREMITY ARTERY LOWER BILAT W/YVETTE (COMBO) to evaluate for possible PAD  - US-EXTREMITY VENOUS LOWER BILAT; to evaluate for possible DVT  Started the patient empirically on Keflex 500 mg p.o. 4 times daily.  Potential side effect of medication discussed with the patient.  Recommend follow-up with PCP in approximately 7 days.    3. Atrial fibrillation, unspecified type (HCC)  Chronic stable condition.  Heart rate today 90.  Advised the patient continue follow-up with cardiology service.  Continue with Eliquis.  No history of bleeding reported.    4. Hyperlipidemia due to type 2 diabetes mellitus (HCC)  - ALANINE AMINO-TRANS; Future  - Lipid Profile; Future  Chronic condition.  Continue with atorvastatin.  5. Hypertension associated with diabetes (HCC)  BP stable continue with current management.  6. Stage 3a chronic kidney disease (HCC)  - Basic Metabolic Panel; Future  Lab tests requested.  Advised the patient to avoid NSAIDs      Other orders  - cephALEXin (KEFLEX) 500 MG Cap; Take 1 Capsule by mouth 4 times a day.  Dispense: 40 Capsule; Refill: 0                     Please note that this dictation was created using voice recognition software. I have made every reasonable attempt to correct obvious errors but there may be errors of grammar and content that I may have overlooked prior to finalization of this note.      Jonathan Mazariegos MD  Internal Medicine  [facility]  clinic

## 2022-07-18 NOTE — ASSESSMENT & PLAN NOTE
This is a chronic condition.  The patient is taking atorvastatin.  Lipid panel ordered for follow-up.

## 2022-07-18 NOTE — ASSESSMENT & PLAN NOTE
Chronic condition.  Patient is taking furosemide lisinopril/hydrochlorothiazide.  No significant side effects reported.

## 2022-07-19 DIAGNOSIS — S81.801A WOUND OF RIGHT LEG, INITIAL ENCOUNTER: ICD-10-CM

## 2022-07-19 LAB
GRAM STN SPEC: NORMAL
SIGNIFICANT IND 70042: NORMAL
SITE SITE: NORMAL
SOURCE SOURCE: NORMAL

## 2022-07-23 DIAGNOSIS — S81.801A WOUND OF RIGHT LEG, INITIAL ENCOUNTER: ICD-10-CM

## 2022-07-25 ENCOUNTER — TELEPHONE (OUTPATIENT)
Dept: URGENT CARE | Facility: PHYSICIAN GROUP | Age: 72
End: 2022-07-25
Payer: MEDICARE

## 2022-07-25 NOTE — TELEPHONE ENCOUNTER
----- Message from Jonathan Mazariegos M.D. sent at 7/23/2022  8:44 AM PDT -----  Pls notify pt    Wound culture showed rare growth of mold.     Recommendation: it is very important to followup with Wound care specialist       [facility]ound [address]  [place] [address]  Phone: [phone]    Please contact the specialist's office to schedule your appointment if you do not already have one.     In addition, I also submitted an urgent referral for pt to see Infectious disease specialist.  for a consultation.  Please call [facility] Referral Department at [phone] to check the status authorization of the referral.

## 2022-07-25 NOTE — TELEPHONE ENCOUNTER
Phone Number Called: [phone] (home)       Call outcome: Did not leave a detailed message. Requested patient to call back.    Message: LVM for pt to cb to discuss lab work.

## 2022-08-02 ENCOUNTER — OFFICE VISIT (OUTPATIENT)
Dept: WOUND CARE | Facility: MEDICAL CENTER | Age: 72
End: 2022-08-02
Attending: INTERNAL MEDICINE
Payer: MEDICARE

## 2022-08-02 VITALS
DIASTOLIC BLOOD PRESSURE: 111 MMHG | SYSTOLIC BLOOD PRESSURE: 192 MMHG | HEART RATE: 100 BPM | RESPIRATION RATE: 20 BRPM | TEMPERATURE: 97.2 F | OXYGEN SATURATION: 94 %

## 2022-08-02 DIAGNOSIS — L08.9 WOUND INFECTION: ICD-10-CM

## 2022-08-02 DIAGNOSIS — L97.919 VENOUS ULCER OF RIGHT LOWER EXTREMITY WITH VARICOSE VEINS (HCC): Primary | ICD-10-CM

## 2022-08-02 DIAGNOSIS — T14.8XXA WOUND INFECTION: ICD-10-CM

## 2022-08-02 DIAGNOSIS — R60.0 BILATERAL LOWER EXTREMITY EDEMA: ICD-10-CM

## 2022-08-02 DIAGNOSIS — I83.019 VENOUS ULCER OF RIGHT LOWER EXTREMITY WITH VARICOSE VEINS (HCC): Primary | ICD-10-CM

## 2022-08-02 PROCEDURE — 11042 DBRDMT SUBQ TIS 1ST 20SQCM/<: CPT

## 2022-08-02 PROCEDURE — 11045 DBRDMT SUBQ TISS EACH ADDL: CPT

## 2022-08-02 PROCEDURE — 99213 OFFICE O/P EST LOW 20 MIN: CPT | Mod: 25 | Performed by: NURSE PRACTITIONER

## 2022-08-02 PROCEDURE — 11045 DBRDMT SUBQ TISS EACH ADDL: CPT | Performed by: NURSE PRACTITIONER

## 2022-08-02 PROCEDURE — 99214 OFFICE O/P EST MOD 30 MIN: CPT | Mod: 25

## 2022-08-02 PROCEDURE — 11042 DBRDMT SUBQ TIS 1ST 20SQCM/<: CPT | Performed by: NURSE PRACTITIONER

## 2022-08-02 ASSESSMENT — ENCOUNTER SYMPTOMS
VOMITING: 0
FEVER: 0
WHEEZING: 0
HEADACHES: 0
SHORTNESS OF BREATH: 0
DOUBLE VISION: 0
CHILLS: 0
DIZZINESS: 0
COUGH: 0
BLURRED VISION: 0
NAUSEA: 0
ROS SKIN COMMENTS: OPEN WOUND RIGHT LEG
PALPITATIONS: 0

## 2022-08-02 NOTE — PATIENT INSTRUCTIONS
-Keep dressings clean and dry. Change dressings once between wound clinic visits, and if they become over saturated, soiled or fall off.     -Avoid prolonged standing or sitting without elevating your legs.    -Remove your compression garments if you have severe pain, severe swelling, numbness, color change, or temperature change in your toes. If you need to remove your compression garments, do so by unrolling them. Do not cut the compression garments off, this is to prevent cutting yourself on accident.    -Should you experience any significant changes in your wound(s), such as signs of infection (increasing redness, swelling, localized heat, increased pain, fever > 101 F, chills) or have any questions regarding your home care instructions, please contact the wound center at [phone]. If after hours, contact your primary care physician or go to the hospital emergency room.     -If you are 5 or more minutes late for an appointment, we reserve the right to cancel and reschedule that appointment. Additionally, if you are habitually late or not showing (3 late cancellations and/or no shows), we reserve the right to cancel your remaining appointments and it will be your responsibility to obtain a new referral if services are still needed.

## 2022-08-02 NOTE — PROGRESS NOTES
Provider Encounter- Lower Extremity Ulcer      HISTORY OF PRESENT ILLNESS  Wound History:   START OF CARE IN CLINIC: 08/02/22   REFERRING PROVIDER: Jonathan Mazariegos    WOUND ETIOLOGY: (venous)   LOCATION: Right medial lower extremity   HISTORY: Patient reports that he was [place] and after walking 3 miles in his fishing boots a blister developed.  After the blister opened the patient reports he was applying topical antibiotic ointment without improvement.  Patient was reportedly seen by his PCP who prescribed Keflex 500 mg 3 times daily.  His PCP also ordered a arterial ultrasound and a venous ultrasound of the right lower extremity and referred the patient to [facility] for continued care.    Pertinent Medical History: Atrial fibrillation, chronic kidney disease stage III, diabetic neuropathy, diabetes type 2, hyperlipidemia, hypertension morbid obesity          TOBACCO USE: Denies history of tobacco use    Patient's problem list, allergies, and current medications reviewed and updated in Epic    Interval History:  8/2/2022: Clinic visit with MOR Farris.  Patient reports that he has compression however has not been wearing them.  Patient also reports that he takes diuretics however did not use them today because he was coming to a doctor's appointment.  Patient reports that his blood sugar in clinic today was       REVIEW OF SYSTEMS:   Review of Systems   Constitutional: Negative for chills and fever.   Eyes: Negative for blurred vision and double vision.   Respiratory: Negative for cough, shortness of breath and wheezing.    Cardiovascular: Positive for leg swelling. Negative for chest pain and palpitations.   Gastrointestinal: Negative for nausea and vomiting.   Skin: Negative for itching and rash.        Open wound right leg   Neurological: Negative for dizziness and headaches.         PHYSICAL EXAMINATION:   BP (!) 192/111 (BP Location: Left arm, Patient Position: Sitting) Comment: RN  notified Comment (BP Location): forearm  Pulse 100   Temp 36.2 °C (97.2 °F)   Resp 20   SpO2 94%     Physical Exam  Constitutional:       Appearance: He is obese.   Cardiovascular:      Rate and Rhythm: Rhythm irregular.      Comments: Monophasic to biphasic pulses to DP and PT via handheld Doppler difficult to determine due to atrial fibrillation  See doc flowsheets and photos  Pulmonary:      Effort: Pulmonary effort is normal. No respiratory distress.      Breath sounds: No wheezing.   Musculoskeletal:      Right lower leg: Edema present.      Left lower leg: Edema present.   Skin:     Comments: Venous ulcer right medial lower extremity  See doc flowsheets and photos   Neurological:      General: No focal deficit present.      Mental Status: He is alert and oriented to person, place, and time.         WOUND ASSESSMENT  Wound 08/02/22 Venous Ulcer Leg Medial Right (Active)   Wound Image    08/02/22 0955   Site Assessment Yellow 08/02/22 0955   Periwound Assessment Edema;Fragile;Hemosiderin Staining 08/02/22 0955   Margins Attached edges 08/02/22 0955   Drainage Amount YAMILA 08/02/22 0955   Treatments Cleansed;Topical Lidocaine;Provider debridement;Compression 08/02/22 0955   Wound Cleansing Puracyn F[place]onham 08/02/22 0955   Periwound Protectant Barrier Paste 08/02/22 0955   Dressing Cleansing/Solutions Not Applicable 08/02/22 0955   Dressing Options Hydrofiber Silver;Absorbent Abdominal Pad;Compression Wrap Two Layer 08/02/22 0955   Non-staged Wound Description Full thickness 08/02/22 0955   Wound Length (cm) 8 cm 08/02/22 0955   Wound Width (cm) 5.2 cm 08/02/22 0955   Wound Depth (cm) 0.3 cm 08/02/22 0955   Wound Surface Area (cm^2) 41.6 cm^2 08/02/22 0955   Wound Volume (cm^3) 12.48 cm^3 08/02/22 0955   Post-Procedure Length (cm) 8.1 cm 08/02/22 0955   Post-Procedure Width (cm) 5.2 cm 08/02/22 0955   Post-Procedure Depth (cm) 0.3 cm 08/02/22 0955   Post-Procedure Surface Area (cm^2) 42.12 cm^2 08/02/22 0955    Post-Procedure Volume (cm^3) 12.636 cm^3 08/02/22 0955   Wound Bed Slough (%) 100 % 08/02/22 0955   Tunneling (cm) 0 cm 08/02/22 0955   Undermining (cm) 0 cm 08/02/22 0955   Wound Odor None 08/02/22 0955   Pulses Right;DP;PT;Doppler 08/02/22 0955   Exposed Structures None 08/02/22 0955   Number of days: 0           PROCEDURE:   -2% viscous lidocaine applied topically to wound bed for approximately 5 minutes prior to debridement  -Curette used to excise nonviable tissue from wound bed.  Excisional debridement was performed to remove devitalized tissue until healthy, bleeding tissue was visualized.   Entire surface of wound, 42.12 cm2 debrided.  Tissue debrided into the subcutaneous layer.    -Bleeding controlled with manual pressure.   -Wound care completed by wound RN, refer to wound flowsheet   -Patient tolerated the procedure well, without c/o of significant pain or discomfort.         Pertinent Labs and Diagnostics:    Labs:   A1c:   Lab Results   Component Value Date/Time    HBA1C 6.7 (A) 07/18/2022 01:34 PM        No results found.    LAST  WOUND CULTURE:  DATE :    Lab Results   Component Value Date/Time    CULTRSULT Rare growth usual skin mara. (A) 07/18/2022 02:02 PM    CULTRSULT Mould  Rare growth   (A) 07/18/2022 02:02 PM              ASSESSMENT AND PLAN:     1. Venous ulcer of right lower extremity with varicose veins (HCC)  -Excisional debridement of wound in clinic today, medically necessary to promote wound healing.  -Patient to return to clinic weekly for assessment and debridement  -Patient instructed to leave dressing intact for the entire week.  Patient is to keep the dressing dry.  Instructed to cover the right lower extremity when showering.  If the dressing becomes saturated the patient was instructed to remove the dressing and apply a cover dressing with his own compression stockings.  -Patient's PCP has ordered a arterial ultrasound as well as a venous ultrasound for DVT of the right lower  extremity patient is to complete.  Patient reports that he already has the number to contact and schedule has not done so yet.   Wound care: Hydrofiber silver, absorbent abdominal pad, 2 layer compression wrap    2. Wound infection  Comments: No signs or symptoms of infections clinic visit patient was placed on Keflex 500 mg 3 times daily by his PCP.  -Continue to monitor for signs symptoms infection needs additional clinic visit    3. Bilateral lower extremity edema  Patient has a history significant for stage III kidney disease contributing to significant bilateral lower extremity edema.  -2 layer compression wrap initiated in clinic today.  -Patient is to continue with his oral diuretics as prescribed  -Patient has his own compression stockings has been somewhat noncompliant in their use.  Patient informed he will need lifelong compression therapy.          PATIENT EDUCATION  - Etiology of  venous stasis ulceration discussed with patient  - Importance of managing edema for healing of ulcer, and for prevention of new ulcer development  -Need for lifelong compression of lower legs   -Elevate legs above the level of the heart periodically throughout the day.  - Importance of adequate nutrition for wound healing  -Advised to go to ER for any increased redness, swelling, drainage or odor, or if patient develops fever, chills, nausea or vomiting.      My total time spent caring for the patient on the day of the encounter was 30 minutes.   This does not include time spent on separately billable procedures/tests.       Please note that this note may have been created using voice recognition software. I have worked with technical experts from [employer] to optimize the interface.  I have made every reasonable attempt to correct obvious errors, but there may be errors of grammar and possibly     N

## 2022-08-03 LAB
BACTERIA WND AEROBE CULT: ABNORMAL
BACTERIA WND AEROBE CULT: ABNORMAL
GRAM STN SPEC: ABNORMAL
SIGNIFICANT IND 70042: ABNORMAL
SITE SITE: ABNORMAL
SOURCE SOURCE: ABNORMAL

## 2022-08-09 ENCOUNTER — OFFICE VISIT (OUTPATIENT)
Dept: WOUND CARE | Facility: MEDICAL CENTER | Age: 72
End: 2022-08-09
Attending: INTERNAL MEDICINE
Payer: MEDICARE

## 2022-08-09 VITALS
TEMPERATURE: 97.8 F | OXYGEN SATURATION: 93 % | DIASTOLIC BLOOD PRESSURE: 137 MMHG | SYSTOLIC BLOOD PRESSURE: 206 MMHG | HEART RATE: 106 BPM | RESPIRATION RATE: 20 BRPM

## 2022-08-09 DIAGNOSIS — T14.8XXA WOUND INFECTION: ICD-10-CM

## 2022-08-09 DIAGNOSIS — I10 HYPERTENSION, UNSPECIFIED TYPE: ICD-10-CM

## 2022-08-09 DIAGNOSIS — I83.019 VENOUS ULCER OF RIGHT LOWER EXTREMITY WITH VARICOSE VEINS (HCC): ICD-10-CM

## 2022-08-09 DIAGNOSIS — L97.919 VENOUS ULCER OF RIGHT LOWER EXTREMITY WITH VARICOSE VEINS (HCC): ICD-10-CM

## 2022-08-09 DIAGNOSIS — R60.0 BILATERAL LOWER EXTREMITY EDEMA: ICD-10-CM

## 2022-08-09 DIAGNOSIS — S81.802A OPEN WOUND OF LEFT LOWER EXTREMITY, INITIAL ENCOUNTER: ICD-10-CM

## 2022-08-09 DIAGNOSIS — L08.9 WOUND INFECTION: ICD-10-CM

## 2022-08-09 PROCEDURE — 11045 DBRDMT SUBQ TISS EACH ADDL: CPT | Performed by: STUDENT IN AN ORGANIZED HEALTH CARE EDUCATION/TRAINING PROGRAM

## 2022-08-09 PROCEDURE — 11042 DBRDMT SUBQ TIS 1ST 20SQCM/<: CPT | Performed by: STUDENT IN AN ORGANIZED HEALTH CARE EDUCATION/TRAINING PROGRAM

## 2022-08-09 PROCEDURE — 11045 DBRDMT SUBQ TISS EACH ADDL: CPT

## 2022-08-09 PROCEDURE — 11042 DBRDMT SUBQ TIS 1ST 20SQCM/<: CPT

## 2022-08-09 ASSESSMENT — ENCOUNTER SYMPTOMS
CHILLS: 0
NAUSEA: 0
BLURRED VISION: 0
FOCAL WEAKNESS: 0
HEADACHES: 0
SHORTNESS OF BREATH: 0
FEVER: 0
DOUBLE VISION: 0
VOMITING: 0
PHOTOPHOBIA: 0
DIZZINESS: 0

## 2022-08-09 NOTE — PROGRESS NOTES
Provider Encounter- Lower Extremity Ulcer      HISTORY OF PRESENT ILLNESS  Wound History:   START OF CARE IN CLINIC: 08/02/22   REFERRING PROVIDER: Jonathan Mazariegos    WOUND ETIOLOGY: (venous)   LOCATION: Right medial lower extremity   HISTORY: Patient reports that he was [place] and after walking 3 miles in his fishing boots a blister developed.  After the blister opened the patient reports he was applying topical antibiotic ointment without improvement.  Patient was reportedly seen by his PCP who prescribed Keflex 500 mg 3 times daily.  His PCP also ordered a arterial ultrasound and a venous ultrasound of the right lower extremity and referred the patient to [facility] for continued care.    Pertinent Medical History: Atrial fibrillation, chronic kidney disease stage III, diabetic neuropathy, diabetes type 2, hyperlipidemia, hypertension morbid obesity          TOBACCO USE: Denies history of tobacco use    Patient's problem list, allergies, and current medications reviewed and updated in Epic    Interval History:  8/2/2022: Clinic visit with MOR Farris.  Patient reports that he has compression however has not been wearing them.  Patient also reports that he takes diuretics however did not use them today because he was coming to a doctor's appointment.  Patient reports that his blood sugar in clinic today was     8/9/2022: Clinic visit with Luis Byrne MD. Patient reports feeling well, denies any fevers or chills. Right leg wound measuring smaller. Patient's left leg with new anterior wound, reports hit leg when putting on shoes. Patient does not want compression wraps on both legs, counseled on the importance of compression. He refused compression wrap on left leg and will wear his own compression socks.   Noted in clinic that BP is uncontrolled, /117 discussed risks of stroke, MI, heart failure, eventual renal failure. He reports that when BP checked at PCP few weeks ago was  normal. He is taking all prescribed BP medications. He denies any symptoms: no HA, SOB, chest pain, vision changes and does not want to go to ED. As he is asymptomatic I recommend obtaining BP monitor and checking BP twice daily and keeping log. If he notes BP remains high recommend he discuss increasing BP medication with PCP.      REVIEW OF SYSTEMS:   Review of Systems   Constitutional: Negative for chills and fever.   Eyes: Negative for blurred vision, double vision and photophobia.   Respiratory: Negative for shortness of breath.    Cardiovascular: Positive for leg swelling. Negative for chest pain.   Gastrointestinal: Negative for nausea and vomiting.   Skin: Negative for itching and rash.        Open wound right leg  New open wound left leg   Neurological: Negative for dizziness, focal weakness and headaches.         PHYSICAL EXAMINATION:   BP (!) 206/137 (BP Location: Left arm, Patient Position: Sitting) Comment: RN notified  Pulse (!) 106   Temp 36.6 °C (97.8 °F)   Resp 20   SpO2 93%     Physical Exam  Constitutional:       Appearance: He is obese.   Cardiovascular:      Rate and Rhythm: Rhythm irregular.      Comments: Monophasic to biphasic pulses to DP and PT via handheld Doppler difficult to determine due to atrial fibrillation  See doc flowsheets and photos  Pulmonary:      Effort: Pulmonary effort is normal. No respiratory distress.      Breath sounds: No wheezing.   Musculoskeletal:      Right lower leg: Edema present.      Left lower leg: Edema present.   Skin:     Comments: Venous ulcer right medial lower extremity  See doc flowsheets and photos   Neurological:      General: No focal deficit present.      Mental Status: He is alert and oriented to person, place, and time.         WOUND ASSESSMENT  Wound 08/02/22 Venous Ulcer Leg Medial Right (Active)   Wound Image    08/09/22 1100   Site Assessment Red;Yellow 08/09/22 1100   Periwound Assessment Edema;Fragile;Hemosiderin Staining 08/09/22 1100    Margins Attached edges 08/09/22 1100   Drainage Amount Large 08/09/22 1100   Drainage Description Serosanguineous 08/09/22 1100   Treatments Cleansed;Topical Lidocaine;Provider debridement 08/09/22 1100   Wound Cleansing Foam Cleanser/Washcloth 08/09/22 1100   Periwound Protectant Barrier Paste 08/09/22 1100   Dressing Cleansing/Solutions Not Applicable 08/09/22 1100   Dressing Options Hydrofiber Silver;Absorbent Abdominal Pad;Compression Wrap Two Layer 08/09/22 1100   Non-staged Wound Description Full thickness 08/09/22 1100   Wound Length (cm) 5.5 cm 08/09/22 1100   Wound Width (cm) 3.8 cm 08/09/22 1100   Wound Depth (cm) 0.2 cm 08/09/22 1100   Wound Surface Area (cm^2) 20.9 cm^2 08/09/22 1100   Wound Volume (cm^3) 4.18 cm^3 08/09/22 1100   Post-Procedure Length (cm) 6 cm 08/09/22 1100   Post-Procedure Width (cm) 4 cm 08/09/22 1100   Post-Procedure Depth (cm) 0.2 cm 08/09/22 1100   Post-Procedure Surface Area (cm^2) 24 cm^2 08/09/22 1100   Post-Procedure Volume (cm^3) 4.8 cm^3 08/09/22 1100   Wound Healing % 67 08/09/22 1100   Wound Bed Slough (%) 100 % 08/02/22 0955   Tunneling (cm) 0 cm 08/09/22 1100   Undermining (cm) 0 cm 08/09/22 1100   Wound Odor None 08/09/22 1100   Pulses Right;DP;PT;Doppler 08/02/22 0955   Exposed Structures None 08/09/22 1100   Number of days: 7       Wound 08/09/22 LLE Anterior (Active)   Wound Image   08/09/22 1100   Site Assessment Red 08/09/22 1100   Periwound Assessment Edema 08/09/22 1100   Margins Attached edges 08/09/22 1100   Drainage Amount Moderate 08/09/22 1100   Drainage Description Serosanguineous 08/09/22 1100   Treatments Cleansed;Provider debridement 08/09/22 1100   Wound Cleansing Normal Saline Irrigation 08/09/22 1100   Periwound Protectant Skin Protectant Wipes to Periwound;Barrier Paste 08/09/22 1100   Dressing Cleansing/Solutions Not Applicable 08/09/22 1100   Dressing Options Hydrofiber Silver;Silicone Adhesive Foam;Tubigrip 08/09/22 1100   Dressing Changed New  08/09/22 1100   Dressing Change/Treatment Frequency Every 72 hrs, and As Needed 08/09/22 1100   Non-staged Wound Description Full thickness 08/09/22 1100   Post-Procedure Length (cm) 1.1 cm 08/09/22 1100   Post-Procedure Width (cm) 1.2 cm 08/09/22 1100   Post-Procedure Depth (cm) 0.1 cm 08/09/22 1100   Post-Procedure Surface Area (cm^2) 1.32 cm^2 08/09/22 1100   Post-Procedure Volume (cm^3) 0.132 cm^3 08/09/22 1100   Tunneling (cm) 0 cm 08/09/22 1100   Undermining (cm) 0 cm 08/09/22 1100   Wound Odor None 08/09/22 1100   Exposed Structures None 08/09/22 1100   Number of days: 0       Wound 08/09/22 LLE Lateral (Active)   Wound Image   08/09/22 1100   Site Assessment Red 08/09/22 1100   Periwound Assessment Edema 08/09/22 1100   Margins Attached edges 08/09/22 1100   Drainage Amount Moderate 08/09/22 1100   Drainage Description Serosanguineous 08/09/22 1100   Treatments Cleansed;Provider debridement 08/09/22 1100   Wound Cleansing Normal Saline Irrigation 08/09/22 1100   Periwound Protectant Skin Protectant Wipes to Periwound;Barrier Paste 08/09/22 1100   Dressing Cleansing/Solutions Not Applicable 08/09/22 1100   Dressing Options Hydrofiber Silver;Silicone Adhesive Foam;Tubigrip 08/09/22 1100   Dressing Changed New 08/09/22 1100   Dressing Change/Treatment Frequency Every 72 hrs, and As Needed 08/09/22 1100   Non-staged Wound Description Full thickness 08/09/22 1100   Post-Procedure Length (cm) 1.7 cm 08/09/22 1100   Post-Procedure Width (cm) 1 cm 08/09/22 1100   Post-Procedure Depth (cm) 0.1 cm 08/09/22 1100   Post-Procedure Surface Area (cm^2) 1.7 cm^2 08/09/22 1100   Post-Procedure Volume (cm^3) 0.17 cm^3 08/09/22 1100   Tunneling (cm) 0 cm 08/09/22 1100   Undermining (cm) 0 cm 08/09/22 1100   Wound Odor None 08/09/22 1100   Exposed Structures None 08/09/22 1100   Number of days: 0           PROCEDURE: Debridement right lower extremity wound, left lower extremity anterior wound, left lower extremity lateral  wounds  -2% viscous lidocaine applied topically to wound bed for approximately 5 minutes prior to debridement  -Curette used to excise nonviable tissue from wound bed.  Excisional debridement was performed to remove devitalized tissue until healthy, bleeding tissue was visualized.   Entire surface of wound, 27.02 cm2 debrided.  Tissue debrided into the subcutaneous layer.    -Bleeding controlled with manual pressure.   -Wound care completed by wound RN, refer to wound flowsheet   -Patient tolerated the procedure well, without c/o of significant pain or discomfort.         Pertinent Labs and Diagnostics:    Labs:   A1c:   Lab Results   Component Value Date/Time    HBA1C 6.7 (A) 07/18/2022 01:34 PM        No results found.    LAST  WOUND CULTURE:  DATE :    Lab Results   Component Value Date/Time    CULTRSULT Rare growth usual skin mara. (A) 07/18/2022 02:02 PM    CULTRSULT Alternaria species  Rare growth   (A) 07/18/2022 02:02 PM              ASSESSMENT AND PLAN:     1. Venous ulcer of right lower extremity with varicose veins (HCC)    8/9/2022  -Wound measuring smaller, thin layer of slough  -Excisional debridement of wound in clinic today, medically necessary to promote wound healing.  -Patient to return to clinic weekly for assessment and debridement  -Patient instructed to leave dressing intact for the entire week.  Patient is to keep the dressing dry.  Instructed to cover the right lower extremity when showering.  If the dressing becomes saturated the patient was instructed to remove the dressing and apply a cover dressing with his own compression stockings.  -Patient's PCP has ordered a arterial ultrasound as well as a venous ultrasound for DVT. Patient has not scheduled, recommend he do so.     Wound care: Hydrofiber silver, absorbent abdominal pad, 2 layer compression wrap    2. Open wound of left lower extremity, initial encounter  Comments: First noted in clinic 8/9 8/9/2022  - New left lower extremity  wound appears as fluid filled crusted blister, reports trauma to anterior leg when putting on shoes  - Excisional debridement was performed in clinic, medically necessary to promote wound healing.  - Counseled on importance of compression, he did not want compression wrap on left leg and will wear compression sock when at home  - Follow up with clinic weekly for assessment and debridement as necessary   Wound Care: hydrofiber silver, adhesive foam, tubigrip    3. Wound infection    8/9/2022  - Patient treated with keflex by PCP  - No evidence of acute infection today in clinic  -Continue to monitor for signs symptoms infection needs additional clinic visit    4. Bilateral lower extremity edema  Patient has a history significant for stage III kidney disease and obesity contributing to significant bilateral lower extremity edema.  - Counseled on chronic edema and effect on wounds.  -2 layer compression wrap initiated in clinic today.  -Patient is to continue with his oral diuretics as prescribed  -Patient has his own compression stockings has been somewhat noncompliant in their use.  Patient informed he will need lifelong compression therapy.    5. Hypertension  8/9/2022  - BP in clinic today 193/117, he is asymptomatic and does not want to go to ED.  - Patient reports last BP check at PCP his BP was normal.  - Counseled on warning signs requiring ED including HA, vision changes, chest pain, SOB. Counseled on long term consequences of uncontrolled HTN including increased risk of CVA, MI, heart failure, progression to renal failure.  - Recommend patient obtain BP monitor and keep BP log at home, if remains elevated will need medication adjustment.      PATIENT EDUCATION  - Etiology of  venous stasis ulceration discussed with patient  - Importance of managing edema for healing of ulcer, and for prevention of new ulcer development  -Need for lifelong compression of lower legs   -Elevate legs above the level of the heart  periodically throughout the day.  - Importance of adequate nutrition for wound healing  -Advised to go to ER for any increased redness, swelling, drainage or odor, or if patient develops fever, chills, nausea or vomiting.      Please note that this note may have been created using voice recognition software. I have worked with technical experts from [employer] to optimize the interface.  I have made every reasonable attempt to correct obvious errors, but there may be errors of grammar and possibly     N

## 2022-08-09 NOTE — PATIENT INSTRUCTIONS
-Keep your wound dressing clean, dry, and intact.    -Change your dressing if it becomes soiled, soaked, or falls off.    -Remove your compression wrap if you have severe pain, severe swelling, numbness, color change, or temperature change in your toes. If you need to remove your compression wrap, do so by unrolling it. Do not cut the compression wrap off to prevent cutting yourself on accident.    -Should you experience any significant changes in your wound(s), such as infection (redness, swelling, localized heat, increased pain, fever > 101 F, chills) or have any questions regarding your home care instructions, please contact the wound center at [phone]. If after hours, contact your primary care physician or go to the hospital emergency room.

## 2022-08-16 ENCOUNTER — NON-PROVIDER VISIT (OUTPATIENT)
Dept: WOUND CARE | Facility: MEDICAL CENTER | Age: 72
End: 2022-08-16
Attending: INTERNAL MEDICINE
Payer: MEDICARE

## 2022-08-16 PROCEDURE — 97597 DBRDMT OPN WND 1ST 20 CM/<: CPT

## 2022-08-16 NOTE — PATIENT INSTRUCTIONS
-Keep your wound dressing clean, dry, and intact.    -Change your dressing if it becomes soiled, soaked, or falls off.    -Remove your compression wrap if you have severe pain, severe swelling, numbness, color change, or temperature change in your toes. If you need to remove your compression wrap, do so by unrolling it. Do not cut the compression wrap off to prevent cutting yourself on accident.    -Should you experience any significant changes in your wound(s), such as infection (redness, swelling, localized heat, increased pain, fever > 101 F, chills) or have any questions regarding your home care instructions, please contact the wound center at [phone]. If after hours, contact your primary care physician or go to the hospital emergency room.

## 2022-08-16 NOTE — PROCEDURES
CSWD with curette to remove non viable tissue from wound beds, ~ 10 cm², scant bleeding noted, controlled with manual pressure. Patient tolerated well with 2% lidocaine to wound bed for dwell time of 5 mins prior to debridement.  Applied 2 layer wrap to RLE.   Patient instructed to bring new compression socks to next wound care appt. Patient states he will buy his own compression socks before his next visit.

## 2022-09-12 RX ORDER — APIXABAN 5 MG/1
TABLET, FILM COATED ORAL
Qty: 60 TABLET | Refills: 6 | Status: SHIPPED | OUTPATIENT
Start: 2022-09-12 | End: 2023-09-14

## 2022-09-12 RX ORDER — ATORVASTATIN CALCIUM 40 MG/1
TABLET, FILM COATED ORAL
Qty: 90 TABLET | Refills: 3 | Status: SHIPPED | OUTPATIENT
Start: 2022-09-12 | End: 2023-09-14 | Stop reason: SDUPTHER

## 2022-10-12 RX ORDER — LISINOPRIL AND HYDROCHLOROTHIAZIDE 25; 20 MG/1; MG/1
TABLET ORAL
Qty: 90 TABLET | Refills: 3 | Status: SHIPPED | OUTPATIENT
Start: 2022-10-12 | End: 2023-09-14 | Stop reason: SDUPTHER

## 2022-10-28 ENCOUNTER — TELEPHONE (OUTPATIENT)
Dept: HEALTH INFORMATION MANAGEMENT | Facility: OTHER | Age: 72
End: 2022-10-28

## 2022-11-17 RX ORDER — FUROSEMIDE 20 MG/1
TABLET ORAL
Qty: 30 TABLET | Refills: 5 | Status: SHIPPED | OUTPATIENT
Start: 2022-11-17 | End: 2023-09-14 | Stop reason: SDUPTHER

## 2022-11-17 RX ORDER — INSULIN GLARGINE 300 U/ML
INJECTION, SOLUTION SUBCUTANEOUS
Qty: 4.5 ML | Refills: 6 | Status: SHIPPED | OUTPATIENT
Start: 2022-11-17 | End: 2023-07-28

## 2022-11-17 NOTE — TELEPHONE ENCOUNTER
Received request via: Pharmacy    Was the patient seen in the last year in this department? Yes    Does the patient have an active prescription (recently filled or refills available) for medication(s) requested? No    Does the patient have care home Plus and need 100 day supply (blood pressure, diabetes and cholesterol meds only)? Patient does not have SCP

## 2023-06-27 RX ORDER — PEN NEEDLE, DIABETIC 32 GX 1/4"
NEEDLE, DISPOSABLE MISCELLANEOUS
Qty: 100 EACH | Refills: 1 | Status: SHIPPED | OUTPATIENT
Start: 2023-06-27 | End: 2023-09-14 | Stop reason: SDUPTHER

## 2023-08-14 ENCOUNTER — TELEPHONE (OUTPATIENT)
Dept: HEALTH INFORMATION MANAGEMENT | Facility: OTHER | Age: 73
End: 2023-08-14

## 2023-09-14 ENCOUNTER — OFFICE VISIT (OUTPATIENT)
Dept: MEDICAL GROUP | Facility: PHYSICIAN GROUP | Age: 73
End: 2023-09-14
Payer: MEDICARE

## 2023-09-14 ENCOUNTER — HOSPITAL ENCOUNTER (OUTPATIENT)
Facility: MEDICAL CENTER | Age: 73
End: 2023-09-14
Attending: INTERNAL MEDICINE
Payer: MEDICARE

## 2023-09-14 ENCOUNTER — HOSPITAL ENCOUNTER (OUTPATIENT)
Dept: LAB | Facility: MEDICAL CENTER | Age: 73
End: 2023-09-14
Attending: INTERNAL MEDICINE
Payer: MEDICARE

## 2023-09-14 VITALS
RESPIRATION RATE: 20 BRPM | TEMPERATURE: 97.6 F | OXYGEN SATURATION: 98 % | DIASTOLIC BLOOD PRESSURE: 80 MMHG | WEIGHT: 286.5 LBS | BODY MASS INDEX: 40.11 KG/M2 | HEIGHT: 71 IN | SYSTOLIC BLOOD PRESSURE: 130 MMHG | HEART RATE: 88 BPM

## 2023-09-14 DIAGNOSIS — E11.69 DYSLIPIDEMIA DUE TO TYPE 2 DIABETES MELLITUS (HCC): ICD-10-CM

## 2023-09-14 DIAGNOSIS — Z79.4 INSULIN LONG-TERM USE (HCC): Chronic | ICD-10-CM

## 2023-09-14 DIAGNOSIS — E11.59 HYPERTENSION ASSOCIATED WITH DIABETES (HCC): ICD-10-CM

## 2023-09-14 DIAGNOSIS — N18.31 STAGE 3A CHRONIC KIDNEY DISEASE: Chronic | ICD-10-CM

## 2023-09-14 DIAGNOSIS — E78.5 DYSLIPIDEMIA DUE TO TYPE 2 DIABETES MELLITUS (HCC): ICD-10-CM

## 2023-09-14 DIAGNOSIS — Z23 NEED FOR VACCINATION: ICD-10-CM

## 2023-09-14 DIAGNOSIS — I15.2 HYPERTENSION ASSOCIATED WITH DIABETES (HCC): ICD-10-CM

## 2023-09-14 DIAGNOSIS — Z12.5 SCREENING FOR MALIGNANT NEOPLASM OF PROSTATE: ICD-10-CM

## 2023-09-14 DIAGNOSIS — E11.42 DIABETIC POLYNEUROPATHY ASSOCIATED WITH TYPE 2 DIABETES MELLITUS (HCC): Chronic | ICD-10-CM

## 2023-09-14 DIAGNOSIS — R80.0 ISOLATED PROTEINURIA WITHOUT SPECIFIC MORPHOLOGIC LESION: Chronic | ICD-10-CM

## 2023-09-14 DIAGNOSIS — E66.01 MORBID OBESITY (HCC): ICD-10-CM

## 2023-09-14 DIAGNOSIS — N18.32 STAGE 3B CHRONIC KIDNEY DISEASE: Chronic | ICD-10-CM

## 2023-09-14 DIAGNOSIS — I48.91 ATRIAL FIBRILLATION, UNSPECIFIED TYPE (HCC): Chronic | ICD-10-CM

## 2023-09-14 LAB
ALT SERPL-CCNC: 11 U/L (ref 2–50)
ANION GAP SERPL CALC-SCNC: 12 MMOL/L (ref 7–16)
BUN SERPL-MCNC: 23 MG/DL (ref 8–22)
CALCIUM SERPL-MCNC: 8.8 MG/DL (ref 8.5–10.5)
CHLORIDE SERPL-SCNC: 101 MMOL/L (ref 96–112)
CHOLEST SERPL-MCNC: 145 MG/DL (ref 100–199)
CO2 SERPL-SCNC: 26 MMOL/L (ref 20–33)
CREAT SERPL-MCNC: 1.84 MG/DL (ref 0.5–1.4)
CREAT UR-MCNC: 23.47 MG/DL
ERYTHROCYTE [DISTWIDTH] IN BLOOD BY AUTOMATED COUNT: 44.9 FL (ref 35.9–50)
FASTING STATUS PATIENT QL REPORTED: NORMAL
GFR SERPLBLD CREATININE-BSD FMLA CKD-EPI: 38 ML/MIN/1.73 M 2
GLUCOSE SERPL-MCNC: 116 MG/DL (ref 65–99)
HBA1C MFR BLD: 5.9 % (ref ?–5.8)
HCT VFR BLD AUTO: 48.1 % (ref 42–52)
HCV AB SER QL: NORMAL
HDLC SERPL-MCNC: 48 MG/DL
HGB BLD-MCNC: 16.4 G/DL (ref 14–18)
LDLC SERPL CALC-MCNC: 80 MG/DL
MCH RBC QN AUTO: 30.3 PG (ref 27–33)
MCHC RBC AUTO-ENTMCNC: 34.1 G/DL (ref 32.3–36.5)
MCV RBC AUTO: 88.9 FL (ref 81.4–97.8)
MICROALBUMIN UR-MCNC: 115.8 MG/DL
MICROALBUMIN/CREAT UR: 4934 MG/G (ref 0–30)
PLATELET # BLD AUTO: 235 K/UL (ref 164–446)
PMV BLD AUTO: 10 FL (ref 9–12.9)
POCT INT CON NEG: NEGATIVE
POCT INT CON POS: POSITIVE
POTASSIUM SERPL-SCNC: 3.8 MMOL/L (ref 3.6–5.5)
PSA SERPL-MCNC: 1.53 NG/ML (ref 0–4)
RBC # BLD AUTO: 5.41 M/UL (ref 4.7–6.1)
SODIUM SERPL-SCNC: 139 MMOL/L (ref 135–145)
TRIGL SERPL-MCNC: 85 MG/DL (ref 0–149)
TSH SERPL DL<=0.005 MIU/L-ACNC: 2.45 UIU/ML (ref 0.38–5.33)
WBC # BLD AUTO: 10.4 K/UL (ref 4.8–10.8)

## 2023-09-14 PROCEDURE — 85027 COMPLETE CBC AUTOMATED: CPT

## 2023-09-14 PROCEDURE — 3079F DIAST BP 80-89 MM HG: CPT | Performed by: INTERNAL MEDICINE

## 2023-09-14 PROCEDURE — 86803 HEPATITIS C AB TEST: CPT

## 2023-09-14 PROCEDURE — 82043 UR ALBUMIN QUANTITATIVE: CPT

## 2023-09-14 PROCEDURE — G0008 ADMIN INFLUENZA VIRUS VAC: HCPCS | Performed by: INTERNAL MEDICINE

## 2023-09-14 PROCEDURE — 84443 ASSAY THYROID STIM HORMONE: CPT

## 2023-09-14 PROCEDURE — 80048 BASIC METABOLIC PNL TOTAL CA: CPT

## 2023-09-14 PROCEDURE — 84460 ALANINE AMINO (ALT) (SGPT): CPT

## 2023-09-14 PROCEDURE — 80061 LIPID PANEL: CPT

## 2023-09-14 PROCEDURE — 3075F SYST BP GE 130 - 139MM HG: CPT | Performed by: INTERNAL MEDICINE

## 2023-09-14 PROCEDURE — 84153 ASSAY OF PSA TOTAL: CPT | Mod: GA

## 2023-09-14 PROCEDURE — 83036 HEMOGLOBIN GLYCOSYLATED A1C: CPT | Performed by: INTERNAL MEDICINE

## 2023-09-14 PROCEDURE — 90662 IIV NO PRSV INCREASED AG IM: CPT | Performed by: INTERNAL MEDICINE

## 2023-09-14 PROCEDURE — 36415 COLL VENOUS BLD VENIPUNCTURE: CPT

## 2023-09-14 PROCEDURE — 99215 OFFICE O/P EST HI 40 MIN: CPT | Mod: 25 | Performed by: INTERNAL MEDICINE

## 2023-09-14 PROCEDURE — 82570 ASSAY OF URINE CREATININE: CPT

## 2023-09-14 RX ORDER — INSULIN GLARGINE 300 U/ML
INJECTION, SOLUTION SUBCUTANEOUS
Qty: 4.5 ML | Refills: 3 | Status: SHIPPED | OUTPATIENT
Start: 2023-09-14 | End: 2023-11-06

## 2023-09-14 RX ORDER — ATORVASTATIN CALCIUM 40 MG/1
40 TABLET, FILM COATED ORAL DAILY
Qty: 90 TABLET | Refills: 3 | Status: SHIPPED | OUTPATIENT
Start: 2023-09-14

## 2023-09-14 RX ORDER — FUROSEMIDE 20 MG/1
20 TABLET ORAL DAILY
Qty: 30 TABLET | Refills: 5 | Status: SHIPPED | OUTPATIENT
Start: 2023-09-14

## 2023-09-14 RX ORDER — APIXABAN 5 MG/1
5 TABLET, FILM COATED ORAL 2 TIMES DAILY
Qty: 60 TABLET | Refills: 11 | Status: SHIPPED | OUTPATIENT
Start: 2023-09-14

## 2023-09-14 RX ORDER — LISINOPRIL AND HYDROCHLOROTHIAZIDE 25; 20 MG/1; MG/1
1 TABLET ORAL DAILY
Qty: 90 TABLET | Refills: 3 | Status: SHIPPED | OUTPATIENT
Start: 2023-09-14

## 2023-09-14 RX ORDER — PEN NEEDLE, DIABETIC 32 GX 1/4"
NEEDLE, DISPOSABLE MISCELLANEOUS
Qty: 100 EACH | Refills: 5 | Status: SHIPPED | OUTPATIENT
Start: 2023-09-14 | End: 2024-03-19

## 2023-09-14 ASSESSMENT — PATIENT HEALTH QUESTIONNAIRE - PHQ9: CLINICAL INTERPRETATION OF PHQ2 SCORE: 0

## 2023-09-14 NOTE — PROGRESS NOTES
PRIMARY CARE CLINIC VISIT    Chief complaint:    Follow-up diabetes  Hyperlipidemia  Atrial fibrillation  CKD 3  Follow-up hypertension  Vaccination update  Obesity        History of Present Illness     Diabetic neuropathy associated with type 2 diabetes mellitus (HCC)  Chronic condition.  The patient is currently on Toujeo insulin 42 units daily and metformin 500 mg twice daily.  Patient denies significant hypoglycemic symptoms.  A1c in the office today 5.9%.    Patient tolerating the medications well.    Due to obesity.  I have discussed with the patient today to consider using medications such as Ozempic or Mounjaro to assist with the weight loss  Potential side effect of the medication discussed with the patient.  At this time the patient declined to change any medication.    Dyslipidemia due to type 2 diabetes mellitus (HCC)  Chronic condition.  The patient is taking atorvastatin 40 Mg daily.  Patient is due for lab test.    Atrial fibrillation (HCC)  This is a chronic condition.  The patient followed by cardiology service at [facility].  Patient is presently taking aspirin 81 mg daily and Eliquis 5 mg twice daily.  Patient denies any history of bleeding.  He denies chest pain shortness of breath palpitation or near syncope.    CKD (chronic kidney disease) stage 3, GFR 30-59 ml/min (HCC)  Chronic condition.  Previous GFR in the 40s.  Lab test ordered for follow-up.  Advised the patient to avoid NSAID.    Proteinuria  Chronic condition.  The patient was referred to nephrology previously.  Advised the patient to avoid NSAID.    Hypertension associated with diabetes (HCC)  Chronic condition.  The patient is currently taking furosemide 20 Mg daily as needed and he also take lisinopril/hydrochlorothiazide.  Blood pressure has been well controlled no significant side effects reported.    Morbid obesity (HCC)  Chronic condition.    Counseling on health consequences related to obesity.  Discussed with the  patient regarding diet, exercise, and lifestyle modification to help achieve and maintain healthy weight      I have discussed with the patient today to consider using medications such as Ozempic or Mounjaro to assist with the weight loss  Potential side effect of the medication discussed with the patient.  At this time the patient declined to change any medication.      Insulin long-term use (HCC)  This is a chronic condition.  The patient has been using insulins for several years.  No significant issue or problem reported.    Need for vaccination  Patient is due for influenza vaccine shingle vaccine Pneumovax and Tdap.  The patient only wishes to get flu shot today    Current Outpatient Medications on File Prior to Visit   Medication Sig Dispense Refill    aspirin EC (ECOTRIN) 81 MG Tablet Delayed Response Take 81 mg by mouth every day.      Blood Glucose Test Strips ONE TOUCH test strips . Check blood sugar 1x per day and prn ssx of high or low sugar 200 Strip 3     No current facility-administered medications on file prior to visit.        Allergies: Patient has no known allergies.    Current Outpatient Medications Ordered in Epic   Medication Sig Dispense Refill    Insulin Glargine, 1 Unit Dial, (TOUJEO SOLOSTAR) 300 UNIT/ML Solution Pen-injector inject 40 units subcutaneously once daily 4.5 mL 3    Insulin Pen Needle (DROPLET PEN NEEDLES) 32G X 6 MM Misc use as directed 100 Each 5    furosemide (LASIX) 20 MG Tab Take 1 Tablet by mouth every day. 30 Tablet 5    lisinopril-hydrochlorothiazide (PRINZIDE) 20-25 MG per tablet Take 1 Tablet by mouth every day. 90 Tablet 3    metFORMIN (GLUCOPHAGE) 500 MG Tab Take 1 Tablet by mouth 2 times a day with meals. 180 Tablet 3    ELIQUIS 5 MG Tab Take 1 Tablet by mouth 2 times a day. 60 Tablet 11    atorvastatin (LIPITOR) 40 MG Tab Take 1 Tablet by mouth every day. 90 Tablet 3    Insulin Pen Needle 32 G x 4 mm For Toujeo BD  Sterile needles  0.23 x 4mm  32Gx 5/32 100 Each 6  "   aspirin EC (ECOTRIN) 81 MG Tablet Delayed Response Take 81 mg by mouth every day.      Blood Glucose Test Strips ONE TOUCH test strips . Check blood sugar 1x per day and prn ssx of high or low sugar 200 Strip 3     No current Epic-ordered facility-administered medications on file.       Past Medical History:   Diagnosis Date    Hypertension     Type II or unspecified type diabetes mellitus without mention of complication, not stated as uncontrolled        History reviewed. No pertinent surgical history.    History reviewed. No pertinent family history.    Social History     Tobacco Use   Smoking Status Never   Smokeless Tobacco Never       Social History     Substance and Sexual Activity   Alcohol Use Yes    Comment: occ       Review of systems.  As per HPI above. All other systems reviewed and negative.      Past Medical, Social, and Family history reviewed and updated in EPIC     Objective     /80   Pulse 88   Temp 36.4 °C (97.6 °F) (Temporal)   Resp 20   Ht 1.803 m (5' 11\")   Wt (!) 130 kg (286 lb 8 oz)   SpO2 98%    Body mass index is 39.96 kg/m².    General: alert in no apparent distress.  Cardiovascular: Irregularly irregular  Pulmonary: lungs : no wheezing   Gastrointestinal: BS present. No obvious mass noted    Monofilament testing with a 10 gram force: sensation intact: decreased bilaterally  Visual Inspection: Feet with mild erythema.  No discharge or fluctuance.  No palpable cord noted.  Pedal pulses: decreased bilaterally     Lab Results   Component Value Date/Time    HBA1C 6.7 (A) 07/18/2022 01:34 PM    HBA1C 7.6 (H) 10/28/2021 11:00 AM    HBA1C 9.5 (H) 06/09/2021 04:13 AM       Lab Results   Component Value Date/Time    WBC 10.7 06/09/2021 04:13 AM    WBC 10.5 04/11/2014 03:14 PM    HEMOGLOBIN 16.7 06/09/2021 04:13 AM    HEMOGLOBIN 14.9 04/11/2014 03:14 PM    HEMATOCRIT 48.1 06/09/2021 04:13 AM    HEMATOCRIT 42.5 04/11/2014 03:14 PM    MCV 88 06/09/2021 04:13 AM    MCV 90.5 04/11/2014 " 03:14 PM    PLATELETCT 264 06/09/2021 04:13 AM    PLATELETCT 247 04/11/2014 03:14 PM         Lab Results   Component Value Date/Time    SODIUM 138 10/28/2021 11:00 AM    POTASSIUM 4.3 10/28/2021 11:00 AM    GLUCOSE 158 (H) 10/28/2021 11:00 AM    BUN 25 (H) 10/28/2021 11:00 AM    CREATININE 1.54 (H) 10/28/2021 11:00 AM       Lab Results   Component Value Date/Time    CHOLSTRLTOT 151 10/28/2021 11:00 AM    TRIGLYCERIDE 103 10/28/2021 11:00 AM    HDL 40 10/28/2021 11:00 AM    LDL 90 10/28/2021 11:00 AM       Lab Results   Component Value Date/Time    ALTSGPT 21 10/28/2021 11:00 AM             Assessment and Plan     1. Diabetic polyneuropathy associated with type 2 diabetes mellitus (HCC)  Chronic condition.  Stable.  Excellent control A1c is 5.9%.  As above the patient declined Ozempic or Mounjaro.  Patient will continue insulin therapy 42 units daily and metformin 500 mg twice daily.    A1c goal of 7% discussed.  Basic physiology of Diabetes was explained to patient as well as possible microvascular/macrovascular complications.  Pt's education:   The importance of increasing physical activity to improve diabetes control was discussed with the patient.   Patient was also educated on changing diet and making better choices to help control blood sugar.   Discussed FBG goal of 100-120, 2hr PP <180       - POCT A1C  - Diabetic Monofilament LE Exam  - Referral to Ophthalmology  - Basic Metabolic Panel; Future  - TSH; Future    2. Insulin long-term use (HCC)  Chronic stable condition.  Continue insulin therapy.  Continue to monitor.    3. Atrial fibrillation, unspecified type (HCC)  Chronic stable condition.  Advised the patient to continue with Eliquis 5 mg twice daily.  Continue follow-up with cardiologist at [place].      4. Stage 3a chronic kidney disease (HCC)  Chronic condition.  Current status unclear.  Lab test ordered for follow-up.  Advised the patient to avoid NSAID.  Continue to monitor.    5. Dyslipidemia due  to type 2 diabetes mellitus (HCC)  - ALANINE AMINO-TRANS; Future  - Lipid Profile; Future  This is a chronic condition.  Status unclear.  Lipid panel requested.  Continue with atorvastatin 40 Mg daily.    6. Hypertension associated with diabetes (HCC)  - CBC WITHOUT DIFFERENTIAL; Future  Chronic stable condition.  Continue with Prinzide 20-25 daily.    7. Morbid obesity (HCC)  Chronic condition.  Unstable.  Recommend diet and lifestyle modification.  Encouraged patient to lose weight.  As above the patient declined Mounjaro or Ozempic.    8. Need for vaccination  - Influenza Vaccine, High Dose (65+ Only)    9. Screening for malignant neoplasm of prostate  - PROSTATE SPECIFIC AG SCREENING; Future        Other orders  - Insulin Glargine, 1 Unit Dial, (TOUJEO SOLOSTAR) 300 UNIT/ML Solution Pen-injector; inject 40 units subcutaneously once daily  Dispense: 4.5 mL; Refill: 3  - Insulin Pen Needle (DROPLET PEN NEEDLES) 32G X 6 MM Misc; use as directed  Dispense: 100 Each; Refill: 5  - furosemide (LASIX) 20 MG Tab; Take 1 Tablet by mouth every day.  Dispense: 30 Tablet; Refill: 5  - lisinopril-hydrochlorothiazide (PRINZIDE) 20-25 MG per tablet; Take 1 Tablet by mouth every day.  Dispense: 90 Tablet; Refill: 3  - metFORMIN (GLUCOPHAGE) 500 MG Tab; Take 1 Tablet by mouth 2 times a day with meals.  Dispense: 180 Tablet; Refill: 3  - ELIQUIS 5 MG Tab; Take 1 Tablet by mouth 2 times a day.  Dispense: 60 Tablet; Refill: 11  - atorvastatin (LIPITOR) 40 MG Tab; Take 1 Tablet by mouth every day.  Dispense: 90 Tablet; Refill: 3  - Insulin Pen Needle 32 G x 4 mm; For Toujeo BD  Sterile needles  0.23 x 4mm  32Gx 5/32  Dispense: 100 Each; Refill: 6        Attestation: I spent:   42   min -  That includes time for chart review before the visit, the actual patient visit, and time spent on documentation in EMR after the visit.  Chart review/prep, review of other providers' records, imaging/lab review, face-to-face time for  history/examination, pt's counseling/education, ordering, prescribing,  review of results/meds/ treatment plan with patient, and care coordination.    The patient is of extensive complexity. This pt is at high risk for complications and morbidity.               Healthcare Maintenance       Health Maintenance Due   Topic Date Due    Annual Wellness Visit  Never done    COVID-19 Vaccine (1) Never done    Pneumococcal Vaccine: 65+ Years (1 - PCV) Never done    IMM DTaP/Tdap/Td Vaccine (1 - Tdap) Never done    Zoster (Shingles) Vaccines (1 of 2) Never done    Diabetes: Retinopathy Screening  07/21/2022    Fasting Lipid Profile  10/28/2022    SERUM CREATININE  10/28/2022    Diabetes: Urine Protein Screening  11/05/2022    A1c Screening  01/18/2023    Influenza Vaccine (1) 09/01/2023               Please note that this dictation was created using voice recognition software. I have made every reasonable attempt to correct obvious errors, but I expect that there are errors of grammar and possibly content that I did not discover before finalizing the note.    Jonathan Mazariegos MD  Internal Medicine  [facility] care [facility]

## 2023-09-14 NOTE — ASSESSMENT & PLAN NOTE
Patient is due for influenza vaccine shingle vaccine Pneumovax and Tdap.  The patient only wishes to get flu shot today

## 2023-09-14 NOTE — ASSESSMENT & PLAN NOTE
This is a chronic condition.  The patient followed by cardiology service at [facility].  Patient is presently taking aspirin 81 mg daily and Eliquis 5 mg twice daily.  Patient denies any history of bleeding.  He denies chest pain shortness of breath palpitation or near syncope.

## 2023-09-14 NOTE — ASSESSMENT & PLAN NOTE
Chronic condition.  The patient was referred to nephrology previously.  Advised the patient to avoid NSAID.

## 2023-09-14 NOTE — ASSESSMENT & PLAN NOTE
Chronic condition.  Previous GFR in the 40s.  Lab test ordered for follow-up.  Advised the patient to avoid NSAID.

## 2023-09-14 NOTE — ASSESSMENT & PLAN NOTE
Chronic condition.    Counseling on health consequences related to obesity.  Discussed with the patient regarding diet, exercise, and lifestyle modification to help achieve and maintain healthy weight      I have discussed with the patient today to consider using medications such as Ozempic or Mounjaro to assist with the weight loss  Potential side effect of the medication discussed with the patient.  At this time the patient declined to change any medication.

## 2023-09-14 NOTE — ASSESSMENT & PLAN NOTE
This is a chronic condition.  The patient has been using insulins for several years.  No significant issue or problem reported.

## 2023-09-14 NOTE — ASSESSMENT & PLAN NOTE
Chronic condition.  The patient is currently on Toujeo insulin 42 units daily and metformin 500 mg twice daily.  Patient denies significant hypoglycemic symptoms.  A1c in the office today 5.9%.    Patient tolerating the medications well.    Due to obesity.  I have discussed with the patient today to consider using medications such as Ozempic or Mounjaro to assist with the weight loss  Potential side effect of the medication discussed with the patient.  At this time the patient declined to change any medication.

## 2023-09-15 ENCOUNTER — TELEPHONE (OUTPATIENT)
Dept: MEDICAL GROUP | Facility: PHYSICIAN GROUP | Age: 73
End: 2023-09-15
Payer: MEDICARE

## 2023-09-16 NOTE — TELEPHONE ENCOUNTER
Called: [phone]     1st attempt to contact patient    Did not answer, left VM to call back at [phone] for more information

## 2023-09-16 NOTE — TELEPHONE ENCOUNTER
----- Message from Jonathan Mazariegos M.D. sent at 9/14/2023  7:04 PM PDT -----  PLS Notify pt    Blood test showed patient with decreasing kidney function    A referral has been submitted to NEPHROLOGY  for a consultation.  If you do not hear from the Referral Coordinator within 3-5 business days, please call [phone] to check the status authorization of the referral

## 2023-09-16 NOTE — TELEPHONE ENCOUNTER
----- Message from Jonathan Mazariegos M.D. sent at 9/14/2023  6:30 PM PDT -----  Pls notify pt    Urine test is abnormal showed elevated protein level.  Recommendation: 24 urine protein test has been ordered for further evaluation. Please return to [facility] lab at your earliest convenience.     Kidney ultrasound has also been ordered  Please call [facility] Radiology [phone] to schedule the appointment.

## 2023-09-19 NOTE — TELEPHONE ENCOUNTER
Phone Number Called: [phone] (home)       Call outcome: Left detailed message for patient. Informed to call back with any additional questions.

## 2023-11-06 RX ORDER — INSULIN GLARGINE 300 U/ML
INJECTION, SOLUTION SUBCUTANEOUS
Qty: 4.5 ML | Refills: 3 | Status: SHIPPED | OUTPATIENT
Start: 2023-11-06 | End: 2024-02-28

## 2023-12-27 ENCOUNTER — TELEPHONE (OUTPATIENT)
Dept: HEALTH INFORMATION MANAGEMENT | Facility: OTHER | Age: 73
End: 2023-12-27

## 2024-02-14 ENCOUNTER — OFFICE VISIT (OUTPATIENT)
Dept: MEDICAL GROUP | Facility: PHYSICIAN GROUP | Age: 74
End: 2024-02-14
Payer: MEDICARE

## 2024-02-14 VITALS
OXYGEN SATURATION: 96 % | RESPIRATION RATE: 16 BRPM | HEART RATE: 90 BPM | SYSTOLIC BLOOD PRESSURE: 136 MMHG | TEMPERATURE: 98.7 F | BODY MASS INDEX: 40.04 KG/M2 | HEIGHT: 71 IN | WEIGHT: 286 LBS | DIASTOLIC BLOOD PRESSURE: 84 MMHG

## 2024-02-14 DIAGNOSIS — I48.91 ATRIAL FIBRILLATION, UNSPECIFIED TYPE (HCC): Chronic | ICD-10-CM

## 2024-02-14 DIAGNOSIS — Z23 NEED FOR VACCINATION: ICD-10-CM

## 2024-02-14 DIAGNOSIS — Z79.4 INSULIN LONG-TERM USE (HCC): Chronic | ICD-10-CM

## 2024-02-14 DIAGNOSIS — E11.42 DIABETIC POLYNEUROPATHY ASSOCIATED WITH TYPE 2 DIABETES MELLITUS (HCC): Chronic | ICD-10-CM

## 2024-02-14 DIAGNOSIS — E78.5 DYSLIPIDEMIA DUE TO TYPE 2 DIABETES MELLITUS (HCC): Chronic | ICD-10-CM

## 2024-02-14 DIAGNOSIS — E66.01 MORBID OBESITY (HCC): Chronic | ICD-10-CM

## 2024-02-14 DIAGNOSIS — E11.59 HYPERTENSION ASSOCIATED WITH DIABETES (HCC): Chronic | ICD-10-CM

## 2024-02-14 DIAGNOSIS — I15.2 HYPERTENSION ASSOCIATED WITH DIABETES (HCC): Chronic | ICD-10-CM

## 2024-02-14 DIAGNOSIS — E11.69 DYSLIPIDEMIA DUE TO TYPE 2 DIABETES MELLITUS (HCC): Chronic | ICD-10-CM

## 2024-02-14 DIAGNOSIS — N18.31 STAGE 3A CHRONIC KIDNEY DISEASE: Chronic | ICD-10-CM

## 2024-02-14 PROBLEM — S81.801A WOUND OF RIGHT LEG, INITIAL ENCOUNTER: Status: RESOLVED | Noted: 2022-07-18 | Resolved: 2024-02-14

## 2024-02-14 PROCEDURE — 99214 OFFICE O/P EST MOD 30 MIN: CPT | Performed by: INTERNAL MEDICINE

## 2024-02-14 PROCEDURE — 3075F SYST BP GE 130 - 139MM HG: CPT | Performed by: INTERNAL MEDICINE

## 2024-02-14 PROCEDURE — 3079F DIAST BP 80-89 MM HG: CPT | Performed by: INTERNAL MEDICINE

## 2024-02-14 ASSESSMENT — PATIENT HEALTH QUESTIONNAIRE - PHQ9: CLINICAL INTERPRETATION OF PHQ2 SCORE: 0

## 2024-02-14 NOTE — PROGRESS NOTES
PRIMARY CARE CLINIC VISIT        Chief Complaint   Patient presents with    Follow-Up      Follow-up diabetes  Atrial fibrillation  Diabetic neuropathy  Obesity  Hyperlipidemia  Hypertension  Request lab test        History of Present Illness     Atrial fibrillation (HCC)  Chronic condition.  Patient was seen by [facility] cardiologist  Patient presently taking Eliquis 5 mg twice daily and aspirin 81 Mg daily.  Patient denies any history of bleeding.    CKD (chronic kidney disease) stage 3, GFR 30-59 ml/min (HCC)  Chronic condition.  Previous GFR 30s to 50s.  Patient asymptomatic.  Pt was referred to Nephrology previously . But pt refused to setup appt.    Diabetic neuropathy associated with type 2 diabetes mellitus (HCC)  chronic condition.  The patient presently being treated with insulin Toujeo 40 units daily.  Metformin 500 mg twice daily.  Patient tolerating medication well.  The patient denies significant hypoglycemic symptoms.    Morbid obesity (HCC)  Chronic condition.  Discussed with the patient regarding diet, exercise, and lifestyle modification to help achieve and maintain healthy weight         Dyslipidemia due to type 2 diabetes mellitus (HCC)   chronic condition for the patient is being treated with atorvastatin 40 Mg daily.  Patient tolerating medication well.    Hypertension associated with diabetes (HCC)  Chronic ongoing condition.  The patient is now taking lisinopril 20 hydrochlorothiazide 25.  Patient tolerating medication well.    Insulin long-term use (HCC)  Chronic condition but the patient has been using insulin for a long time.  No issues or problems reported.    Need for vaccination  Recommend Tdap and pneumonia shot.  The patient refused.    Current Outpatient Medications on File Prior to Visit   Medication Sig Dispense Refill    TOUJEO SOLOSTAR 300 UNIT/ML Solution Pen-injector inject 40 units subcutaneously once daily 4.5 mL 3    Insulin Pen Needle (DROPLET PEN NEEDLES) 32G X 6 MM  Misc use as directed 100 Each 5    furosemide (LASIX) 20 MG Tab Take 1 Tablet by mouth every day. 30 Tablet 5    lisinopril-hydrochlorothiazide (PRINZIDE) 20-25 MG per tablet Take 1 Tablet by mouth every day. 90 Tablet 3    metFORMIN (GLUCOPHAGE) 500 MG Tab Take 1 Tablet by mouth 2 times a day with meals. 180 Tablet 3    ELIQUIS 5 MG Tab Take 1 Tablet by mouth 2 times a day. 60 Tablet 11    atorvastatin (LIPITOR) 40 MG Tab Take 1 Tablet by mouth every day. 90 Tablet 3    Insulin Pen Needle 32 G x 4 mm For Toujeo BD  Sterile needles  0.23 x 4mm  32Gx 5/32 100 Each 6    Blood Glucose Test Strips ONE TOUCH test strips . Check blood sugar 1x per day and prn ssx of high or low sugar 200 Strip 3    aspirin EC (ECOTRIN) 81 MG Tablet Delayed Response Take 81 mg by mouth every day.       No current facility-administered medications on file prior to visit.        Allergies: Patient has no known allergies.    Current Outpatient Medications Ordered in Epic   Medication Sig Dispense Refill    TOUTynker SOLOSTAR 300 UNIT/ML Solution Pen-injector inject 40 units subcutaneously once daily 4.5 mL 3    Insulin Pen Needle (DROPLET PEN NEEDLES) 32G X 6 MM Misc use as directed 100 Each 5    furosemide (LASIX) 20 MG Tab Take 1 Tablet by mouth every day. 30 Tablet 5    lisinopril-hydrochlorothiazide (PRINZIDE) 20-25 MG per tablet Take 1 Tablet by mouth every day. 90 Tablet 3    metFORMIN (GLUCOPHAGE) 500 MG Tab Take 1 Tablet by mouth 2 times a day with meals. 180 Tablet 3    ELIQUIS 5 MG Tab Take 1 Tablet by mouth 2 times a day. 60 Tablet 11    atorvastatin (LIPITOR) 40 MG Tab Take 1 Tablet by mouth every day. 90 Tablet 3    Insulin Pen Needle 32 G x 4 mm For Toujeo BD  Sterile needles  0.23 x 4mm  32Gx 5/32 100 Each 6    Blood Glucose Test Strips ONE TOUCH test strips . Check blood sugar 1x per day and prn ssx of high or low sugar 200 Strip 3    aspirin EC (ECOTRIN) 81 MG Tablet Delayed Response Take 81 mg by mouth every day.       No  "current [facility]-ordered facility-administered medications on file.       Past Medical History:   Diagnosis Date    Hypertension     Type II or unspecified type diabetes mellitus without mention of complication, not stated as uncontrolled        History reviewed. No pertinent surgical history.    History reviewed. No pertinent family history.    Social History     Tobacco Use   Smoking Status Never   Smokeless Tobacco Never       Social History     Substance and Sexual Activity   Alcohol Use Yes    Comment: occ       Review of systems.  As per HPI above. All other systems reviewed and negative.      Past Medical, Social, and Family history reviewed and updated in EPIC     Objective     /84 (BP Location: Left arm, Patient Position: Sitting, BP Cuff Size: Large adult)   Pulse 90   Temp 37.1 °C (98.7 °F) (Temporal)   Resp 16   Ht 1.803 m (5' 11\")   Wt (!) 130 kg (286 lb)   SpO2 96%    Body mass index is 39.89 kg/m².    General: alert in no apparent distress.  Cardiovascular: Irregularly irregular     pulmonary: lungs : no wheezing   Gastrointestinal: BS present.         Lab Results   Component Value Date/Time    HBA1C 5.9 (A) 09/14/2023 08:00 AM    HBA1C 6.7 (A) 07/18/2022 01:34 PM    HBA1C 7.6 (H) 10/28/2021 11:00 AM       Lab Results   Component Value Date/Time    WBC 10.4 09/14/2023 08:21 AM    HEMOGLOBIN 16.4 09/14/2023 08:21 AM    HEMATOCRIT 48.1 09/14/2023 08:21 AM    MCV 88.9 09/14/2023 08:21 AM    PLATELETCT 235 09/14/2023 08:21 AM         Lab Results   Component Value Date/Time    SODIUM 139 09/14/2023 08:21 AM    POTASSIUM 3.8 09/14/2023 08:21 AM    GLUCOSE 116 (H) 09/14/2023 08:21 AM    BUN 23 (H) 09/14/2023 08:21 AM    CREATININE 1.84 (H) 09/14/2023 08:21 AM       Lab Results   Component Value Date/Time    CHOLSTRLTOT 145 09/14/2023 08:21 AM    TRIGLYCERIDE 85 09/14/2023 08:21 AM    HDL 48 09/14/2023 08:21 AM    LDL 80 09/14/2023 08:21 AM       Lab Results   Component Value Date/Time    ALTSGPT 11 " 09/14/2023 08:21 AM             Assessment and Plan     1. Atrial fibrillation, unspecified type (HCC)  Chronic condition.    Patient reported that he was seen previously by [facility] cardiologist.  Patient currently taking aspirin and Eliquis 5 mg twice daily.  He denies any history of bleeding.  Patient request to establish care with [facility] cardiologist.  - REFERRAL TO CARDIOLOGY    2. Stage 3a chronic kidney disease (HCC)  Chronic condition.  Current status unclear.  The patient refused to schedule an appointment with nephrologist previously.  Lab test ordered for follow-up.  Advised the patient to avoid NSAID.    3. Diabetic polyneuropathy associated with type 2 diabetes mellitus (HCC)  - HEMOGLOBIN A1C; Future  - Basic Metabolic Panel; Future  - Referral to Pharmacotherapy Service  Chronic condition.  Current status unclear.  Lab test ordered for follow-up.  Today I had a long discussion with the patient regarding Ozempic which would assist with glycemic control and to assist the patient with the weight loss.  Refer the patient to pharmacotherapy to assist transitioning the patient to Ozempic.    4. Morbid obesity (HCC)  Chronic condition.  Recommend diet and lifestyle modification.  Encouraged patient to lose weight    5. Dyslipidemia due to type 2 diabetes mellitus (HCC)  - Lipid Profile; Future  - ALANINE AMINO-TRANS; Future  Chronic condition.  Current status unclear.  Lab test ordered for follow-up    6. Hypertension associated with diabetes ([facility])  This is a chronic condition.  Stable.  Continue lisinopril 20 hydrochlorothiazide 25.    7. Insulin long-term use (HCC)  Chronic stable condition.  Continue with Toujeo insulin.  40 units daily.  As above we will plan to transition the patient to Ozempic.  Refer the patient to pharmacotherapy clinic       8. Need for vaccination    Patient refuses recommended vaccines.      Attestation: I spent:  34   min -  That includes time for chart review before the visit,  the actual patient visit, and time spent on documentation in EMR after the visit.  Chart review/prep, review of other providers' records, imaging/lab review, face-to-face time for history/examination, pt's counseling/education, ordering, prescribing,  review of results/meds/ treatment plan with patient, and care coordination.         Please note that this dictation was created using voice recognition software. I have made every reasonable attempt to correct obvious errors, but I expect that there are errors of grammar and possibly content that I did not discover before finalizing the note.    Jonathan Mazariegos MD  Internal Medicine  [facility]

## 2024-02-14 NOTE — ASSESSMENT & PLAN NOTE
Chronic condition but the patient has been using insulin for a long time.  No issues or problems reported.

## 2024-02-14 NOTE — ASSESSMENT & PLAN NOTE
Chronic condition.  Patient was seen by [facility] cardiologist  Patient presently taking Eliquis 5 mg twice daily and aspirin 81 Mg daily.  Patient denies any history of bleeding.

## 2024-02-14 NOTE — ASSESSMENT & PLAN NOTE
chronic condition for the patient is being treated with atorvastatin 40 Mg daily.  Patient tolerating medication well.

## 2024-02-14 NOTE — ASSESSMENT & PLAN NOTE
Chronic condition.  Previous GFR 30s to 50s.  Patient asymptomatic.  Pt was referred to Nephrology previously . But pt refused to setup appt.

## 2024-02-14 NOTE — ASSESSMENT & PLAN NOTE
chronic condition.  The patient presently being treated with insulin Toujeo 40 units daily.  Metformin 500 mg twice daily.  Patient tolerating medication well.  The patient denies significant hypoglycemic symptoms.

## 2024-02-14 NOTE — ASSESSMENT & PLAN NOTE
Chronic ongoing condition.  The patient is now taking lisinopril 20 hydrochlorothiazide 25.  Patient tolerating medication well.

## 2024-02-28 RX ORDER — INSULIN GLARGINE 300 U/ML
INJECTION, SOLUTION SUBCUTANEOUS
Qty: 5 EACH | Refills: 6 | Status: SHIPPED | OUTPATIENT
Start: 2024-02-28

## 2024-02-28 NOTE — TELEPHONE ENCOUNTER
Received request via: Pharmacy    Was the patient seen in the last year in this department? Yes    Does the patient have an active prescription (recently filled or refills available) for medication(s) requested? No    [facility]     Does the patient have USP Plus and need 100 day supply (blood pressure, diabetes and cholesterol meds only)? Patient does not have SCP

## 2024-03-06 ENCOUNTER — NON-PROVIDER VISIT (OUTPATIENT)
Dept: VASCULAR LAB | Facility: MEDICAL CENTER | Age: 74
End: 2024-03-06
Attending: INTERNAL MEDICINE
Payer: MEDICARE

## 2024-03-06 VITALS
DIASTOLIC BLOOD PRESSURE: 113 MMHG | SYSTOLIC BLOOD PRESSURE: 152 MMHG | HEART RATE: 92 BPM | BODY MASS INDEX: 39.47 KG/M2 | WEIGHT: 283 LBS

## 2024-03-06 DIAGNOSIS — E11.9 TYPE 2 DIABETES MELLITUS WITHOUT COMPLICATION, WITHOUT LONG-TERM CURRENT USE OF INSULIN (HCC): ICD-10-CM

## 2024-03-06 LAB
HBA1C MFR BLD: 5.9 % (ref ?–5.8)
POCT INT CON NEG: NEGATIVE
POCT INT CON POS: POSITIVE

## 2024-03-06 PROCEDURE — 83036 HEMOGLOBIN GLYCOSYLATED A1C: CPT

## 2024-03-06 PROCEDURE — 99213 OFFICE O/P EST LOW 20 MIN: CPT

## 2024-03-06 RX ORDER — SEMAGLUTIDE 1.34 MG/ML
.25-.5 INJECTION, SOLUTION SUBCUTANEOUS
Qty: 1.5 ML | Refills: 1 | Status: SHIPPED | OUTPATIENT
Start: 2024-03-06

## 2024-03-06 NOTE — PROGRESS NOTES
Patient Consult Note    TIME IN: 8:00 am  TIME OUT: 8:35 am    Primary care physician: Jonathan Mazariegos M.D.    Reason for consult: Management of Controlled Type 2 Diabetes    HPI:  Bennett Callahan is a 74 y.o. old patient who comes in today for evaluation of above stated problem.    Allergies  Patient has no known allergies.    Current Diabetes Medication Regimen  Metformin IR: 500 mg BID    Basal Insulin: Toujeo 40 units    Previous Diabetes Medications and Reason for Discontinuation  none    Potential Barriers to Care:  Adherence: denies missed doses  Side effects: none  Affordability: yes affordable    SMBG  Pt has home glucometer and proper testing technique -   Discussed BG Goals: FBG 80 - 130, 2hPP < 180, A1c < 7.0%    Pt reports blood sugars:   AM: 110-115  Before lunch/dinner: 130    Hypoglycemia  Hypoglycemia awareness: Yes  Nocturnal hypoglycemia: None  Hypoglycemia:  None  Pt's treatment of Hypoglycemia  Discussed 15:15 Rule    Lifestyle  Current Exercise - walk 30-45 minutes once per week, patient likes fishing  Exercise Goal - 50 minutes daily    Dietary -   Breakfast - oatmeal, cereal  Lunch - sandwich, skips  Dinner - steak, ribs, corn, green beans, yams  Snacks - none, beef jerky  Drinks - coffee 2 cups, water, sweetened ice tea once per day in summer, tomato/cranberry juice    Labs  Lab Results   Component Value Date/Time    HBA1C 5.9 (A) 09/14/2023 08:00 AM      Lab Results   Component Value Date/Time    SODIUM 139 09/14/2023 08:21 AM    POTASSIUM 3.8 09/14/2023 08:21 AM    CHLORIDE 101 09/14/2023 08:21 AM    CO2 26 09/14/2023 08:21 AM    GLUCOSE 116 (H) 09/14/2023 08:21 AM    BUN 23 (H) 09/14/2023 08:21 AM    CREATININE 1.84 (H) 09/14/2023 08:21 AM    BUNCREATRAT 14 06/09/2021 04:13 AM     Lab Results   Component Value Date/Time    ALKPHOSPHAT 65 04/11/2014 03:14 PM    ASTSGOT 20 04/11/2014 03:14 PM    ALTSGPT 11 09/14/2023 08:21 AM    TBILIRUBIN 0.7 04/11/2014 03:14 PM    INR 0.98  04/11/2014 03:14 PM    ALBUMIN 4.4 04/11/2014 03:14 PM        Physical Examination:  Vital signs: BP (!) 152/113   Pulse 92   Wt (!) 128 kg (283 lb)   BMI 39.47 kg/m²  Body mass index is 39.47 kg/m².    Assessment and Plan:    1. DM2  Basic physiology of DMII was explained to patient as well as microvascular/macrovascular complications. The importance of increasing physical activity to improve diabetes control was discussed with the patient. Patient was also educated on changing diet and making better choices to help control blood sugar.   Discussed Goals: FBG 80 - 130, 2hPP < 180, a1c < 7.0%  Last a1c drawn today was 5.9%, which is at goal  Discussed initiation of GLP1 to decrease need for insulin, patient would like to try. Discussed MOA, adverse effects and administration with patient.    - Medication changes:  Start Ozempic 0.25 mg weekly   Decrease Toujeo to 35 units daily   - Continue:  Metformin 500 mg BID     - Lifestyle changes:  Increase exercise, goal of 1 hour 5 days per week  Increase veggies/protein, focus on fresh foods    - Preventative management:  Care gaps addressed: BP elevated today 152/113, recommend that patient have labs repeated and will coordinate with Dr Mazariegos for appropriate management.    Follow Up:  3 weeks for Ozempic titration    Ana Laura Kern PharmD    CC:   Jonathan Mazariegos M.D.

## 2024-03-13 ENCOUNTER — TELEPHONE (OUTPATIENT)
Dept: MEDICAL GROUP | Facility: PHYSICIAN GROUP | Age: 74
End: 2024-03-13
Payer: MEDICARE

## 2024-03-13 ENCOUNTER — HOSPITAL ENCOUNTER (OUTPATIENT)
Dept: LAB | Facility: MEDICAL CENTER | Age: 74
End: 2024-03-13
Attending: INTERNAL MEDICINE
Payer: MEDICARE

## 2024-03-13 DIAGNOSIS — E78.5 DYSLIPIDEMIA DUE TO TYPE 2 DIABETES MELLITUS (HCC): Chronic | ICD-10-CM

## 2024-03-13 DIAGNOSIS — E11.69 DYSLIPIDEMIA DUE TO TYPE 2 DIABETES MELLITUS (HCC): Chronic | ICD-10-CM

## 2024-03-13 DIAGNOSIS — E11.42 DIABETIC POLYNEUROPATHY ASSOCIATED WITH TYPE 2 DIABETES MELLITUS (HCC): Chronic | ICD-10-CM

## 2024-03-13 LAB
ALT SERPL-CCNC: 10 U/L (ref 2–50)
ANION GAP SERPL CALC-SCNC: 10 MMOL/L (ref 7–16)
BUN SERPL-MCNC: 22 MG/DL (ref 8–22)
CALCIUM SERPL-MCNC: 9 MG/DL (ref 8.5–10.5)
CHLORIDE SERPL-SCNC: 103 MMOL/L (ref 96–112)
CHOLEST SERPL-MCNC: 137 MG/DL (ref 100–199)
CO2 SERPL-SCNC: 27 MMOL/L (ref 20–33)
CREAT SERPL-MCNC: 1.87 MG/DL (ref 0.5–1.4)
FASTING STATUS PATIENT QL REPORTED: NORMAL
GFR SERPLBLD CREATININE-BSD FMLA CKD-EPI: 37 ML/MIN/1.73 M 2
GLUCOSE SERPL-MCNC: 81 MG/DL (ref 65–99)
HDLC SERPL-MCNC: 48 MG/DL
LDLC SERPL CALC-MCNC: 74 MG/DL
POTASSIUM SERPL-SCNC: 4.1 MMOL/L (ref 3.6–5.5)
SODIUM SERPL-SCNC: 140 MMOL/L (ref 135–145)
TRIGL SERPL-MCNC: 75 MG/DL (ref 0–149)

## 2024-03-13 PROCEDURE — 80061 LIPID PANEL: CPT

## 2024-03-13 PROCEDURE — 36415 COLL VENOUS BLD VENIPUNCTURE: CPT

## 2024-03-13 PROCEDURE — 83036 HEMOGLOBIN GLYCOSYLATED A1C: CPT | Mod: GA

## 2024-03-13 PROCEDURE — 84460 ALANINE AMINO (ALT) (SGPT): CPT

## 2024-03-13 PROCEDURE — 80048 BASIC METABOLIC PNL TOTAL CA: CPT

## 2024-03-13 NOTE — TELEPHONE ENCOUNTER
Name: Bennett Callahan  Phone number: [phone]    Message: Patient called and left a voicemail, patient is requesting Freestyle lite test strips to be sent to [facility].

## 2024-03-14 LAB
EST. AVERAGE GLUCOSE BLD GHB EST-MCNC: 120 MG/DL
HBA1C MFR BLD: 5.8 % (ref 4–5.6)

## 2024-03-18 ENCOUNTER — TELEPHONE (OUTPATIENT)
Dept: VASCULAR LAB | Facility: MEDICAL CENTER | Age: 74
End: 2024-03-18
Payer: MEDICARE

## 2024-03-18 NOTE — TELEPHONE ENCOUNTER
Received New start PA request via [employer]  for   Semaglutide,0.25 or 0.5MG/DOS, (OZEMPIC, 0.25 OR 0.5 MG/DOSE,) 2 MG/1.5ML Solution Pen-injector. (Quantity:9 mls, Day Supply:90)     Insurance: [employer] [place]   Member ID:  [id number]  BIN: [id number]  PCN: [id number]   Group: [id number]     Ran Test claim via [place] [employer] medication Pays for a $45/90DS ; $15/30DS copay. Will outreach to patient to offer specialty pharmacy services and or release to preferred pharmacy    EMA Knott, PhT  Vascular Pharmacy Liaison (Rx Coordinator)  P: [phone]  3/18/2024 3:29 PM

## 2024-03-19 RX ORDER — PEN NEEDLE, DIABETIC 32 GX 1/4"
NEEDLE, DISPOSABLE MISCELLANEOUS
Qty: 100 EACH | Refills: 5 | Status: SHIPPED | OUTPATIENT
Start: 2024-03-19

## 2024-03-27 ENCOUNTER — NON-PROVIDER VISIT (OUTPATIENT)
Dept: VASCULAR LAB | Facility: MEDICAL CENTER | Age: 74
End: 2024-03-27
Attending: INTERNAL MEDICINE
Payer: MEDICARE

## 2024-03-27 PROCEDURE — 99212 OFFICE O/P EST SF 10 MIN: CPT

## 2024-03-27 NOTE — PATIENT INSTRUCTIONS
INCREASE Ozempic to 0.5 mg once weekly    DECREASE Toujeo to 15 units once daily    If blood sugar in the morning is trending less than 70, decrease Toujeo by 2 units every 3 days. Goal morning blood sugar

## 2024-03-27 NOTE — PROGRESS NOTES
Patient Consult Note    Primary care physician: Jonathan Mazariegos M.D.    Reason for consult: Management of Controlled Type 2 Diabetes    HPI:  Bennett Callahan is a 74 y.o. old patient who comes in today for evaluation of above stated problem.    Allergies  Patient has no known allergies.    Current Diabetes Medication Regimen  Metformin IR: 500 mg BID  GLP1 agonist: Ozempic 0.25 mg subQ once weekly    Basal Insulin: Toujeo 35 units    Previous Diabetes Medications and Reason for Discontinuation  none    Potential Barriers to Care:  Adherence: denies missed doses  Side effects: none  Affordability: yes affordable    SMBG  Pt has home glucometer and proper testing technique -   Discussed BG Goals: FBG 80 - 130, 2hPP < 180, A1c < 7.0%    Pt reports blood sugars:   AM Fastin, 114, 60, 73, 83, 75, 85, 67, 110, 67, 99, 75, 112, 95, 84, 66  After dinner: 137, 126, 147, 97, 123    Hypoglycemia  Hypoglycemia awareness: Yes  Nocturnal hypoglycemia: None  Hypoglycemia:  None  Pt's treatment of Hypoglycemia  Discussed 15:15 Rule    Lifestyle  Current Exercise - walk 30-45 minutes daily. Patient likes [employer]. Uses station cycling machine he will use.  Exercise Goal - Increase as tolerated    Dietary - Noted appetite suppression since starting GLP1 therapy  Breakfast - oatmeal, cereal  Lunch - sandwich, skips  Dinner - steak, ribs, corn, green beans, yams  Snacks - none, beef jerky  Drinks - coffee 2 cups, water, sweetened ice tea once per day in summer, tomato/cranberry juice    Labs  Lab Results   Component Value Date/Time    HBA1C 5.8 (H) 2024 08:10 AM      Lab Results   Component Value Date/Time    SODIUM 140 2024 08:10 AM    POTASSIUM 4.1 2024 08:10 AM    CHLORIDE 103 2024 08:10 AM    CO2 27 2024 08:10 AM    GLUCOSE 81 2024 08:10 AM    BUN 22 2024 08:10 AM    CREATININE 1.87 (H) 2024 08:10 AM    BUNCREATRAT 14 2021 04:13 AM     Lab Results   Component Value  Date/Time    ALKPHOSPHAT 65 04/11/2014 03:14 PM    ASTSGOT 20 04/11/2014 03:14 PM    ALTSGPT 10 03/13/2024 08:10 AM    TBILIRUBIN 0.7 04/11/2014 03:14 PM    INR 0.98 04/11/2014 03:14 PM    ALBUMIN 4.4 04/11/2014 03:14 PM        Physical Examination:  Vital signs: There were no vitals taken for this visit. There is no height or weight on file to calculate BMI.    Assessment and Plan:    1. DM2  Since last visit, pt was able to obtain and start Ozempic w/ no issues or SE. Noted appetite suppression at this time.  Discussed Goals: FBG 80 - 130, 2hPP < 180, a1c < 7.0%  Last a1c drawn on 3/13/24 was 5.8%, which is at goal  Pt reported BG is low - he does have a few readings < 70. Likely requiring less insulin w/ GLP1 therapy.  Pt increasing his exercise. Diet remains largely unchanged.    - Medication changes:  INCREASE Ozempic to 0.5 mg weekly   Decrease Toujeo to 15 units daily   (~ 50% decrease given hypoglycemia and increase in GLP1 dose)  Counseled pt to titrate by 2 units every 3 days to maintain FBG   - Continue:  Metformin 500 mg BID     - Lifestyle changes:  Increase exercise, goal of 1 hour 5 days per week  Increase veggies/protein, focus on fresh foods, avoidance of carbs/simple sugars    Follow Up:  3 weeks    Alex Singh, [place]    CC:   Jonathan Mazariegos M.D.

## 2024-04-17 ENCOUNTER — NON-PROVIDER VISIT (OUTPATIENT)
Dept: VASCULAR LAB | Facility: MEDICAL CENTER | Age: 74
End: 2024-04-17
Attending: INTERNAL MEDICINE
Payer: MEDICARE

## 2024-04-17 PROCEDURE — 99212 OFFICE O/P EST SF 10 MIN: CPT | Performed by: PHARMACIST

## 2024-04-17 NOTE — PROGRESS NOTES
Patient Consult Note - Follow Up Visit  Primary care physician: Jonathan Mazariegos M.D.    Reason for consult: Management of Uncontrolled Type 2 Diabetes    HPI:  Bennett Callahan is a 74 y.o. old patient who comes in today for evaluation of above stated problem.    Most Recent HbA1c:   Lab Results   Component Value Date/Time    HBA1C 5.8 (H) 03/13/2024 08:10 AM        Current Diabetes Medication Regimen  Metformin IR: 500 mg BID  GLP1 agonist: Ozempic 0.25 mg subQ once weekly - did not increase to 0.5mg dose as he was afraid dose was too large.     Basal Insulin: Toujeo 20 units     Previous Diabetes Medications and Reason for Discontinuation  none     Potential Barriers to Care:  Adherence: denies missed doses  Side effects: none  Affordability: yes affordable    Discussed FBG goal of , 2hrPP <180, and A1c <7.0%.  Before Breakfast:  81, 91, 88, 102, 108, 84, 128, 112, 98, 91, 138, 137  Before Lunch:  After Dinner:  137, 1216, 147, 97, 123, 138, 115, 117, 147, 118, 108, 114, 124, 98, 127  Before Bedtime:  Other times:  Hypoglycemia:  None    Nutrition/Exercise:  No remarkable changes from previous visit shown below:    Current Exercise - walk 30-45 minutes daily. Patient likes fishing. Uses station cycling machine he will use.  Exercise Goal - Increase as tolerated     Dietary - Noted appetite suppression since starting GLP1 therapy  Breakfast - oatmeal, cereal  Lunch - sandwich, skips  Dinner - steak, ribs, corn, green beans, yams  Snacks - none, beef jerky  Drinks - coffee 2 cups, water, sweetened ice tea once per day in summer, tomato/cranberry juice    Preventative Management  BP regimen (ACEi/ARB): Lisin-HCTZ 20-25mg QD  BP <140/90: Yes  Statin: atorvastatin (Lipitor) 40 mg daily  LDL <100: Yes  Lab Results   Component Value Date/Time    CHOLSTRLTOT 137 03/13/2024 08:10 AM    LDL 74 03/13/2024 08:10 AM    HDL 48 03/13/2024 08:10 AM    TRIGLYCERIDE 75 03/13/2024 08:10 AM       Last Microalbumin/Cr:  Lab  Results   Component Value Date/Time    MALBCRT 4934 (H) 09/14/2023 08:50 AM    MICROALBUR 115.8 09/14/2023 08:50 AM     Last A1c:  Lab Results   Component Value Date/Time    HBA1C 5.8 (H) 03/13/2024 08:10 AM      Last Retinal Scan: up to date, 10/2023    Monofilament exam: up to date, 9/2023     ROS:  Constitutional: No weight loss  Cardiac: No palpitations or racing heart  Resp: No shortness of breath  Neuro: No numbness or tinging in feet  Endo: No heat or cold intolerance, no polyuria or polydipsia  All other systems were reviewed and were negative.    Past Medical History:  Patient Active Problem List    Diagnosis Date Noted    Need for vaccination 09/14/2023    Insulin long-term use (HCC) 07/18/2022    CKD (chronic kidney disease) stage 3, GFR 30-59 ml/min 08/02/2021    Proteinuria 08/02/2021    Morbid obesity (HCC) 08/02/2021    Atrial fibrillation ([facility]) 04/15/2021    Leg swelling 04/15/2021    History of stroke 04/11/2014    Diabetic neuropathy associated with type 2 diabetes mellitus ([facility]) 04/11/2014    Dyslipidemia due to type 2 diabetes mellitus ([facility]) 04/11/2014    Hypertension associated with diabetes ([facility]) 04/11/2014       Past Surgical History:  No past surgical history on file.    Allergies:  Patient has no known allergies.    Social History:  Social History     Socioeconomic History    Marital status:      Spouse name: Not on file    Number of children: Not on file    Years of education: Not on file    Highest education level: Not on file   Occupational History    Not on file   Tobacco Use    Smoking status: Never    Smokeless tobacco: Never   Vaping Use    Vaping Use: Never used   Substance and Sexual Activity    Alcohol use: Yes     Comment: occ    Drug use: No    Sexual activity: Not on file   Other Topics Concern    Not on file   Social History Narrative    Not on file     Social Determinants of Health     Financial Resource Strain: Not on file   Food Insecurity: Not on file    Transportation Needs: Not on file   Physical Activity: Not on file   Stress: Not on file   Social Connections: Not on file   Intimate Partner Violence: Not on file   Housing Stability: Not on file       Family History:  No family history on file.    Medications:    Current Outpatient Medications:     Insulin Pen Needle (BD PEN NEEDLE MICRO U/F) 32G X 6 MM Misc, USE AS DIRECTED, Disp: 100 Each, Rfl: 5    Blood Glucose Test Strips, FREESTYLE FREEDOM LITE . Check blood sugar 1x per day .  ICD10 code E11.69, Disp: 100 Strip, Rfl: 3    Semaglutide,0.25 or 0.5MG/DOS, (OZEMPIC, 0.25 OR 0.5 MG/DOSE,) 2 MG/1.5ML Solution Pen-injector, Inject 0.25-0.5 mg under the skin every 7 days. (Patient taking differently: Inject 0.5 mg under the skin every 7 days.), Disp: 1.5 mL, Rfl: 1    Insulin Glargine, 1 Unit Dial, (TOUJEO SOLOSTAR) 300 UNIT/ML Solution Pen-injector, INJECT 40 UNITS SUBCUTANEOUSLY ONCE DAILY (Patient taking differently: Inject 15 Units under the skin at bedtime.), Disp: 5 Each, Rfl: 6    furosemide (LASIX) 20 MG Tab, Take 1 Tablet by mouth every day., Disp: 30 Tablet, Rfl: 5    lisinopril-hydrochlorothiazide (PRINZIDE) 20-25 MG per tablet, Take 1 Tablet by mouth every day., Disp: 90 Tablet, Rfl: 3    metFORMIN (GLUCOPHAGE) 500 MG Tab, Take 1 Tablet by mouth 2 times a day with meals., Disp: 180 Tablet, Rfl: 3    ELIQUIS 5 MG Tab, Take 1 Tablet by mouth 2 times a day., Disp: 60 Tablet, Rfl: 11    atorvastatin (LIPITOR) 40 MG Tab, Take 1 Tablet by mouth every day., Disp: 90 Tablet, Rfl: 3    Insulin Pen Needle 32 G x 4 mm, For Toujeo BD  Sterile needles  0.23 x 4mm  32Gx 5/32, Disp: 100 Each, Rfl: 6    Blood Glucose Test Strips, ONE TOUCH test strips . Check blood sugar 1x per day and prn ssx of high or low sugar, Disp: 200 Strip, Rfl: 3    aspirin EC (ECOTRIN) 81 MG Tablet Delayed Response, Take 81 mg by mouth every day., Disp: , Rfl:     Labs: Reviewed    Physical Examination:  Vital signs: There were no vitals  "taken for this visit. There is no height or weight on file to calculate BMI.  General: No apparent distress, cooperative  Eyes: No scleral icterus or discharge  ENMT: Normal on external inspection of nose, lips, normal thyroid exam  Neck: No abnormal masses on inspection  Resp: Normal effort, clear to auscultation bilaterally   CVS: Regular rate and rhythm, S1 S2 normal, no murmur   Extremities: No edema  Abdomen: abdominal obesity present  Neuro: Alert and oriented  Skin: No rash  Psych: Normal mood and affect, intact memory and able to make informed decisions    Assessment and Plan:    Basic physiology of DMII was explained to patient as well as microvascular/macrovascular complications. The importance of increasing physical activity to improve diabetes control was discussed with the patient. Patient was also educated on changing diet and making better choices to help control blood sugar.    . DM2  Since last visit, pt was hesitant to increase Ozempic as he felt the dose was \"too big\", so he kept at 0.25mg.   He did decrease insulin to 20 units daily.   Discussed Goals: FBG 80 - 130, 2hPP < 180, a1c < 7.0%  Last a1c drawn on 3/13/24 was 5.8%, which is at goal  No low readings since last visit.  Pt increasing his exercise. Diet remains largely unchanged.     - Medication changes:  INCREASE Ozempic to 0.5 mg weekly - walked through dosing dialing on demo pen.  Decrease Toujeo to 15 units daily   Counseled pt to titrate by 2 units every 3 days to maintain FBG   - Continue:  Metformin 500 mg BID      - Lifestyle changes:  Increase exercise, goal of 1 hour 5 days per week  Increase veggies/protein, focus on fresh foods, avoidance of carbs/simple sugars    Follow Up:  Three weeks    Thank you for allowing me to participate in the care of this patient.    Gato Strauss, PharmD, BCACP  04/17/24    CC:   Jonathan Mazariegos M.D.      "

## 2024-04-23 ENCOUNTER — TELEPHONE (OUTPATIENT)
Dept: HEALTH INFORMATION MANAGEMENT | Facility: OTHER | Age: 74
End: 2024-04-23
Payer: MEDICARE

## 2024-04-24 ENCOUNTER — APPOINTMENT (OUTPATIENT)
Dept: CARDIOLOGY | Facility: MEDICAL CENTER | Age: 74
End: 2024-04-24
Attending: INTERNAL MEDICINE
Payer: MEDICARE

## 2024-05-07 ENCOUNTER — NON-PROVIDER VISIT (OUTPATIENT)
Dept: VASCULAR LAB | Facility: MEDICAL CENTER | Age: 74
End: 2024-05-07
Attending: INTERNAL MEDICINE
Payer: MEDICARE

## 2024-05-07 VITALS — SYSTOLIC BLOOD PRESSURE: 170 MMHG | HEART RATE: 99 BPM | DIASTOLIC BLOOD PRESSURE: 60 MMHG

## 2024-05-07 DIAGNOSIS — I15.2 HYPERTENSION ASSOCIATED WITH DIABETES (HCC): Chronic | ICD-10-CM

## 2024-05-07 DIAGNOSIS — E11.59 HYPERTENSION ASSOCIATED WITH DIABETES (HCC): Chronic | ICD-10-CM

## 2024-05-07 DIAGNOSIS — Z86.73 HISTORY OF STROKE: ICD-10-CM

## 2024-05-07 DIAGNOSIS — E11.9 TYPE 2 DIABETES MELLITUS WITHOUT COMPLICATION, WITHOUT LONG-TERM CURRENT USE OF INSULIN (HCC): ICD-10-CM

## 2024-05-07 RX ORDER — AMLODIPINE BESYLATE 5 MG/1
5 TABLET ORAL DAILY
Qty: 90 TABLET | Refills: 1 | Status: SHIPPED | OUTPATIENT
Start: 2024-05-07

## 2024-05-07 NOTE — PROGRESS NOTES
Patient Consult Note      Primary care physician: Jonathan Mazariegos M.D.    Reason for consult: Management of Controlled Type 2 Diabetes    HPI:  Bennett Callahan is a 74 y.o. old patient who comes in today for evaluation of above stated problem.    Allergies  Patient has no known allergies.    Current Diabetes Medication Regimen  Metformin IR: 500mg BID (renally dose adjusted)  GLP-1 or GLP-1/GIP Agent: semaglutide (Ozempic) 0.5 mg weekly    Basal Insulin: Toujeo 15-20 units QHS depending on his prandial glucose  - He states when his evening glucose is 150, he will inject 20 units    Previous Diabetes Medications and Reason for Discontinuation  none    Potential Barriers to Care:  Adherence: reports missed doses of evening metformin dose 1x/week  Side effects: Gi issues (constipation, diarrhea, stomach upset) resolved this past week on Ozempic  Affordability: covered by insurance    SMBG  Pt has home glucometer and proper testing technique - yes  Discussed BG Goals: FBG 80 - 130, 2hPP < 180, A1c < 7.0%    Pt reports blood sugars:   Before Breakfast: 109-128  Before Bedtime: 119-158    Hypoglycemia  Hypoglycemia awareness: Yes  Nocturnal hypoglycemia: None  Hypoglycemia:   1x in the morning when 70  Pt's treatment of Hypoglycemia  Discussed 15:15 Rule    Lifestyle  Current Exercise - walk 30-45 minutes daily. Patient likes fishing. Uses station cycling machine he will use.     Dietary - Noted appetite suppression since starting GLP1 therapy  Breakfast - oatmeal  Lunch - skips lunch most times  Dinner - small portion consisting of pork, chicken, vegetables, sometimes rice  Snacks - cookies made of whole grains and nuts, fruits  Drinks - coffee 2 cups every morning, water, sweetened ice tea once per day in summer, tomato/cranberry/orange juice    Labs  Lab Results   Component Value Date/Time    HBA1C 5.8 (H) 03/13/2024 08:10 AM      Lab Results   Component Value Date/Time    SODIUM 140 03/13/2024 08:10 AM     POTASSIUM 4.1 03/13/2024 08:10 AM    CHLORIDE 103 03/13/2024 08:10 AM    CO2 27 03/13/2024 08:10 AM    GLUCOSE 81 03/13/2024 08:10 AM    BUN 22 03/13/2024 08:10 AM    CREATININE 1.87 (H) 03/13/2024 08:10 AM    BUNCREATRAT 14 06/09/2021 04:13 AM     Lab Results   Component Value Date/Time    ALKPHOSPHAT 65 04/11/2014 03:14 PM    ASTSGOT 20 04/11/2014 03:14 PM    ALTSGPT 10 03/13/2024 08:10 AM    TBILIRUBIN 0.7 04/11/2014 03:14 PM    INR 0.98 04/11/2014 03:14 PM    ALBUMIN 4.4 04/11/2014 03:14 PM      Lab Results   Component Value Date/Time    CHOLSTRLTOT 137 03/13/2024 08:10 AM    LDL 74 03/13/2024 08:10 AM    HDL 48 03/13/2024 08:10 AM    TRIGLYCERIDE 75 03/13/2024 08:10 AM       Lab Results   Component Value Date/Time    MALBCRT 4934 (H) 09/14/2023 08:50 AM    MICROALBUR 115.8 09/14/2023 08:50 AM       Physical Examination:  Vital signs: BP (!) 170/60   Pulse 99  There is no height or weight on file to calculate BMI.    Assessment and Plan:    1. DM2  Discussed Goals: FBG 80 - 130, 2hPP < 180, a1c < 7.0%  Last a1c drawn on 03/13/24 was 5.8%, which is at goal  Pt states his GI ADRs from increasing Ozempic has resolved this past week. Because of this, will refrain from increasing Ozempic for now.  Pt also has been taking Toujeo 20 units qhs when his evening BG is above 150, instead of decreasing Toujeo to 15units qhs as instructed at the previous visit  He reports 1 hypoglycemic episode in the morning  We discussed his PPG goal is <180    - Medication changes:  Decrease Toujeo to 15 units qhs   - Continue:  Ozempic 0.5mg q 7 days  Metformin 500mg bid (renally dose adjusted)     - Lifestyle changes:  Exercise Goal - increase as tolerated  Dietary Goal - stop drinking juice    - Preventative management:  REC DM Score: 1  Care gaps addressed:   BP is >140/90, which is elevated in office  Pt is on lisinopril/HCTZ 20mg/25mg daily  Will have pt start amlodipine 5mg daily  Care gaps updated in Health Maintenance    Follow  Up:  4 weeks    Maggy Rodriguez, PharmD    CC:   Jonathan Mazariegos M.D.

## 2024-05-13 DIAGNOSIS — E11.9 TYPE 2 DIABETES MELLITUS WITHOUT COMPLICATION, WITHOUT LONG-TERM CURRENT USE OF INSULIN (HCC): ICD-10-CM

## 2024-05-13 RX ORDER — SEMAGLUTIDE 0.68 MG/ML
0.5 INJECTION, SOLUTION SUBCUTANEOUS
Qty: 3 ML | Refills: 5 | Status: SHIPPED | OUTPATIENT
Start: 2024-05-13 | End: 2024-05-15

## 2024-05-14 ENCOUNTER — TELEPHONE (OUTPATIENT)
Dept: PHARMACY | Facility: MEDICAL CENTER | Age: 74
End: 2024-05-14
Payer: MEDICARE

## 2024-05-14 NOTE — TELEPHONE ENCOUNTER
Received New Start request via [employer]  for (OZEMPIC, 0.25 OR 0.5 MG/DOSE,) 2 MG/3ML Solution Pen-injector. (Quantity:3mL, Day Supply:28)     Insurance: [facility] / [employer] [place]  Member ID:  [id number]  BIN: [id number]  PCN: [id number]  Group: [id number]     Ran Test claim via [place] & medication Pays for a $15 copay. Will outreach to patient to offer specialty pharmacy services and or release to preferred pharmacy    Thank you,  Barbra Avitia  Pharmacy Liaison  [facility] and [facility]  [phone]

## 2024-05-15 DIAGNOSIS — E11.9 TYPE 2 DIABETES MELLITUS WITHOUT COMPLICATION, WITHOUT LONG-TERM CURRENT USE OF INSULIN (HCC): ICD-10-CM

## 2024-05-15 RX ORDER — SEMAGLUTIDE 0.68 MG/ML
0.5 INJECTION, SOLUTION SUBCUTANEOUS
Qty: 9 ML | Refills: 1 | Status: SHIPPED | OUTPATIENT
Start: 2024-05-15

## 2024-05-15 NOTE — TELEPHONE ENCOUNTER
Called pt @ [phone] and offered [facility] pharmacy services. Pt declined, releasing to preferred pharmacy. ([facility][id number] [address])    Thank you,  Barbra Avitia  Pharmacy Liaison  [facility] and [facility]  [phone]

## 2024-06-04 ENCOUNTER — NON-PROVIDER VISIT (OUTPATIENT)
Dept: VASCULAR LAB | Facility: MEDICAL CENTER | Age: 74
End: 2024-06-04
Attending: INTERNAL MEDICINE
Payer: MEDICARE

## 2024-06-04 VITALS — SYSTOLIC BLOOD PRESSURE: 161 MMHG | DIASTOLIC BLOOD PRESSURE: 89 MMHG | HEART RATE: 97 BPM

## 2024-06-04 DIAGNOSIS — E11.59 HYPERTENSION ASSOCIATED WITH DIABETES (HCC): ICD-10-CM

## 2024-06-04 DIAGNOSIS — E11.9 TYPE 2 DIABETES MELLITUS WITHOUT COMPLICATION, WITHOUT LONG-TERM CURRENT USE OF INSULIN (HCC): ICD-10-CM

## 2024-06-04 DIAGNOSIS — I15.2 HYPERTENSION ASSOCIATED WITH DIABETES (HCC): ICD-10-CM

## 2024-06-04 PROCEDURE — 99212 OFFICE O/P EST SF 10 MIN: CPT

## 2024-06-04 RX ORDER — AMLODIPINE BESYLATE 10 MG/1
10 TABLET ORAL NIGHTLY
Qty: 90 TABLET | Refills: 1 | Status: SHIPPED | OUTPATIENT
Start: 2024-06-04

## 2024-06-04 RX ORDER — SEMAGLUTIDE 1.34 MG/ML
1 INJECTION, SOLUTION SUBCUTANEOUS
Qty: 9 ML | Refills: 1 | Status: SHIPPED | OUTPATIENT
Start: 2024-06-04

## 2024-06-04 NOTE — PROGRESS NOTES
Patient Consult Note      Primary care physician: Jonathan Mazariegos M.D.    Reason for consult: Management of Controlled Type 2 Diabetes    HPI:  Bennett Callahan is a 74 y.o. old patient who comes in today for evaluation of above stated problem.    Allergies  Patient has no known allergies.    Current Diabetes Medication Regimen  Metformin IR: 500mg BID (renally dose adjusted)  GLP-1 or GLP-1/GIP Agent: semaglutide (Ozempic) 0.5 mg weekly    Basal Insulin: Toujeo 15 units QHS    Previous Diabetes Medications and Reason for Discontinuation  none    Potential Barriers to Care:  Adherence: denies missed doses  Side effects: none  Affordability: covered by insurance    SMBG  Pt has home glucometer and proper testing technique - yes  Discussed BG Goals: FBG 80 - 130, 2hPP < 180, A1c < 7.0%    Pt reports blood sugars:   Before Breakfast: , low 68  Before Bedtime: 106-149    Hypoglycemia  Hypoglycemia awareness: Yes  Nocturnal hypoglycemia: None  Hypoglycemia:  asymptomatic hypoglycemia 5/29 at 68  Pt's treatment of Hypoglycemia  Discussed 15:15 Rule    Lifestyle  Current Exercise - walk 30-45 minutes daily. Patient likes [employer]. Uses station cycling machine he will use.     Dietary - Noted appetite suppression since starting GLP1 therapy  Breakfast - oatmeal  Lunch - skips lunch most times  Dinner - small portion consisting of pork, chicken, vegetables, sometimes rice  Snacks - cookies made of whole grains and nuts, fruits  Drinks - coffee 2 cups every morning, water, sweetened ice tea once per day in summer, tomato/cranberry/orange juice    Labs  Lab Results   Component Value Date/Time    HBA1C 5.8 (H) 03/13/2024 08:10 AM      Lab Results   Component Value Date/Time    SODIUM 140 03/13/2024 08:10 AM    POTASSIUM 4.1 03/13/2024 08:10 AM    CHLORIDE 103 03/13/2024 08:10 AM    CO2 27 03/13/2024 08:10 AM    GLUCOSE 81 03/13/2024 08:10 AM    BUN 22 03/13/2024 08:10 AM    CREATININE 1.87 (H) 03/13/2024 08:10 AM     BUNCREATRAT 14 06/09/2021 04:13 AM     Lab Results   Component Value Date/Time    ALKPHOSPHAT 65 04/11/2014 03:14 PM    ASTSGOT 20 04/11/2014 03:14 PM    ALTSGPT 10 03/13/2024 08:10 AM    TBILIRUBIN 0.7 04/11/2014 03:14 PM    INR 0.98 04/11/2014 03:14 PM    ALBUMIN 4.4 04/11/2014 03:14 PM      Lab Results   Component Value Date/Time    CHOLSTRLTOT 137 03/13/2024 08:10 AM    LDL 74 03/13/2024 08:10 AM    HDL 48 03/13/2024 08:10 AM    TRIGLYCERIDE 75 03/13/2024 08:10 AM       Lab Results   Component Value Date/Time    MALBCRT 4934 (H) 09/14/2023 08:50 AM    MICROALBUR 115.8 09/14/2023 08:50 AM       Physical Examination:  Vital signs: BP (!) 161/89   Pulse 97  There is no height or weight on file to calculate BMI.    Assessment and Plan:    1. DM2  Discussed Goals: FBG 80 - 130, 2hPP < 180, a1c < 7.0%  Last a1c drawn on 03/13/24 was 5.8%, which is at goal  Pt denies adverse effects with any medications, diabetes and HTN.  Pt tolerating Ozempic 0.5mg well, will increase to 1mg weekly and continue to lower insulin requirement.  Pt has been taking Toujeo 15 units qhs. Given blood sugars within goal, as well as increasing GLP1, will decrease to Toujeo 10 units qhs.  He reports 1 asymptomatic hypoglycemic episode in the morning  We discussed his PPG goal is <180    - Medication changes:  Decrease Toujeo to 10 units qhs   Increase Ozempic to 1 mg q 7 days    - Continue:  Metformin 500mg bid (renally dose adjusted)     - Lifestyle changes:  Exercise Goal - increase as tolerated  Dietary Goal - stop drinking juice    - Preventative management:  REC DM Score: 1  Care gaps addressed:   BP is >140/90, which is elevated in office  Pt is on lisinopril/HCTZ 20mg/25mg daily and amlodipine 5mg daily  Would like to increase amlodipine to 10mg given no hypotension or peripheral edema.  Increase Amlodipine to 10mg daily  Care gaps updated in Health Maintenance    Follow Up:  4 weeks    Cielo Cabral, Pharmacy Student     Maggy JACOBS  Michael PharmD    CC:   Jonathan Mazariegos M.D.

## 2024-06-07 ENCOUNTER — TELEPHONE (OUTPATIENT)
Dept: VASCULAR LAB | Facility: MEDICAL CENTER | Age: 74
End: 2024-06-07
Payer: MEDICARE

## 2024-06-08 NOTE — TELEPHONE ENCOUNTER
Received New start PA request via [employer]  for Semaglutide, 1 MG/DOSE, (OZEMPIC, 1 MG/DOSE,) 4 MG/3ML Solution Pen-injector . (Quantity:9 mls, Day Supply:90)     Insurance: [employer] [place]  Member ID:  [id number]  BIN: [id number]  PCN: [id number]  Group: [id number]     Ran Test claim via [place] & medication  pays for $45/90ds ; $15/30ds. Per previous benefits investigation that was initiated on 5/14, pt already declined [facility]. Will release Rx to pharmacy on file: [id number]---[address], [place] [address]    EMA Knott, PhT  Vascular Pharmacy Liaison (Rx Coordinator)  P: [phone]  6/7/2024 8:22 PM

## 2024-07-02 ENCOUNTER — NON-PROVIDER VISIT (OUTPATIENT)
Dept: VASCULAR LAB | Facility: MEDICAL CENTER | Age: 74
End: 2024-07-02
Attending: INTERNAL MEDICINE
Payer: MEDICARE

## 2024-07-02 VITALS — SYSTOLIC BLOOD PRESSURE: 159 MMHG | HEART RATE: 70 BPM | DIASTOLIC BLOOD PRESSURE: 84 MMHG

## 2024-07-02 DIAGNOSIS — E11.59 HYPERTENSION ASSOCIATED WITH DIABETES (HCC): ICD-10-CM

## 2024-07-02 DIAGNOSIS — I15.2 HYPERTENSION ASSOCIATED WITH DIABETES (HCC): ICD-10-CM

## 2024-07-02 DIAGNOSIS — E11.9 TYPE 2 DIABETES MELLITUS WITHOUT COMPLICATION, WITHOUT LONG-TERM CURRENT USE OF INSULIN (HCC): ICD-10-CM

## 2024-07-02 PROCEDURE — 99212 OFFICE O/P EST SF 10 MIN: CPT

## 2024-07-02 RX ORDER — LISINOPRIL AND HYDROCHLOROTHIAZIDE 25; 20 MG/1; MG/1
2 TABLET ORAL DAILY
Qty: 180 TABLET | Refills: 1 | Status: SHIPPED | OUTPATIENT
Start: 2024-07-02

## 2024-07-25 ENCOUNTER — HOSPITAL ENCOUNTER (OUTPATIENT)
Dept: LAB | Facility: MEDICAL CENTER | Age: 74
End: 2024-07-25
Attending: NURSE PRACTITIONER
Payer: MEDICARE

## 2024-07-25 DIAGNOSIS — E11.9 TYPE 2 DIABETES MELLITUS WITHOUT COMPLICATION, WITHOUT LONG-TERM CURRENT USE OF INSULIN (HCC): ICD-10-CM

## 2024-07-25 LAB
ALBUMIN SERPL BCP-MCNC: 3.7 G/DL (ref 3.2–4.9)
ALBUMIN/GLOB SERPL: 1.3 G/DL
ALP SERPL-CCNC: 114 U/L (ref 30–99)
ALT SERPL-CCNC: 8 U/L (ref 2–50)
ANION GAP SERPL CALC-SCNC: 16 MMOL/L (ref 7–16)
AST SERPL-CCNC: 16 U/L (ref 12–45)
BILIRUB SERPL-MCNC: 0.8 MG/DL (ref 0.1–1.5)
BUN SERPL-MCNC: 29 MG/DL (ref 8–22)
CALCIUM ALBUM COR SERPL-MCNC: 9.5 MG/DL (ref 8.5–10.5)
CALCIUM SERPL-MCNC: 9.3 MG/DL (ref 8.5–10.5)
CHLORIDE SERPL-SCNC: 101 MMOL/L (ref 96–112)
CO2 SERPL-SCNC: 21 MMOL/L (ref 20–33)
CREAT SERPL-MCNC: 1.91 MG/DL (ref 0.5–1.4)
EST. AVERAGE GLUCOSE BLD GHB EST-MCNC: 114 MG/DL
GFR SERPLBLD CREATININE-BSD FMLA CKD-EPI: 36 ML/MIN/1.73 M 2
GLOBULIN SER CALC-MCNC: 2.8 G/DL (ref 1.9–3.5)
GLUCOSE SERPL-MCNC: 115 MG/DL (ref 65–99)
HBA1C MFR BLD: 5.6 % (ref 4–5.6)
POTASSIUM SERPL-SCNC: 4.3 MMOL/L (ref 3.6–5.5)
PROT SERPL-MCNC: 6.5 G/DL (ref 6–8.2)
SODIUM SERPL-SCNC: 138 MMOL/L (ref 135–145)

## 2024-07-25 PROCEDURE — 80053 COMPREHEN METABOLIC PANEL: CPT

## 2024-07-25 PROCEDURE — 83036 HEMOGLOBIN GLYCOSYLATED A1C: CPT | Mod: GA

## 2024-07-25 PROCEDURE — 36415 COLL VENOUS BLD VENIPUNCTURE: CPT | Mod: GA

## 2024-08-06 ENCOUNTER — APPOINTMENT (OUTPATIENT)
Dept: VASCULAR LAB | Facility: MEDICAL CENTER | Age: 74
End: 2024-08-06
Attending: INTERNAL MEDICINE
Payer: MEDICARE

## 2024-08-28 ENCOUNTER — NON-PROVIDER VISIT (OUTPATIENT)
Dept: VASCULAR LAB | Facility: MEDICAL CENTER | Age: 74
End: 2024-08-28
Attending: INTERNAL MEDICINE
Payer: MEDICARE

## 2024-08-28 VITALS — SYSTOLIC BLOOD PRESSURE: 177 MMHG | HEART RATE: 79 BPM | DIASTOLIC BLOOD PRESSURE: 88 MMHG

## 2024-08-28 DIAGNOSIS — I15.2 HYPERTENSION ASSOCIATED WITH DIABETES (HCC): Chronic | ICD-10-CM

## 2024-08-28 DIAGNOSIS — E11.59 HYPERTENSION ASSOCIATED WITH DIABETES (HCC): Chronic | ICD-10-CM

## 2024-08-28 PROCEDURE — 99212 OFFICE O/P EST SF 10 MIN: CPT

## 2024-08-28 NOTE — PROGRESS NOTES
Patient Consult Note      Primary care physician: Jonathan Mazariegos M.D.    Reason for consult: Management of Controlled Type 2 Diabetes    HPI:  Bennett Callahan is a 74 y.o. old patient who comes in today for evaluation of above stated problem.    Allergies  Patient has no known allergies.    Current Diabetes Medication Regimen  Metformin IR: 500mg BID (renally dose adjusted)  GLP-1 or GLP-1/GIP Agent: semaglutide (Ozempic) 1 mg weekly     Basal Insulin: Toujeo 10 units QHS - has not used in months    Previous Diabetes Medications and Reason for Discontinuation  none    Potential Barriers to Care:  Adherence: denies missed doses  Side effects: appetite suppression  Affordability: covered by insurance    SMBG  Pt has home glucometer and proper testing technique - yes  Discussed BG Goals: FBG 80 - 130, 2hPP < 180, A1c < 7.0%    Pt reports blood sugars:   Before Breakfast:     Hypoglycemia  Hypoglycemia awareness: Yes  Nocturnal hypoglycemia: None  Hypoglycemia:  None  Pt's treatment of Hypoglycemia  Discussed 15:15 Rule    Lifestyle  Current Exercise - walk 30-45 minutes daily. Patient likes fishing. Uses stationary cycling machine.     Dietary - Notes continued appetite suppression   Breakfast - oatmeal  Lunch - skips lunch   Dinner - small portion consisting of pork, chicken, vegetables, sometimes rice  Snacks - not snacking as much; if he does he will snack on granola bars or felicia butter  Drinks - coffee 2 cups every morning, water, sweetened ice tea once per day in summer, tomato/cranberry/orange juice    Labs  Lab Results   Component Value Date/Time    HBA1C 5.6 07/25/2024 07:38 AM      Lab Results   Component Value Date/Time    SODIUM 138 07/25/2024 07:38 AM    POTASSIUM 4.3 07/25/2024 07:38 AM    CHLORIDE 101 07/25/2024 07:38 AM    CO2 21 07/25/2024 07:38 AM    GLUCOSE 115 (H) 07/25/2024 07:38 AM    BUN 29 (H) 07/25/2024 07:38 AM    CREATININE 1.91 (H) 07/25/2024 07:38 AM    BUNCREATRAT 14  06/09/2021 04:13 AM     Lab Results   Component Value Date/Time    ALKPHOSPHAT 114 (H) 07/25/2024 07:38 AM    ASTSGOT 16 07/25/2024 07:38 AM    ALTSGPT 8 07/25/2024 07:38 AM    TBILIRUBIN 0.8 07/25/2024 07:38 AM    INR 0.98 04/11/2014 03:14 PM    ALBUMIN 3.7 07/25/2024 07:38 AM      Lab Results   Component Value Date/Time    CHOLSTRLTOT 137 03/13/2024 08:10 AM    LDL 74 03/13/2024 08:10 AM    HDL 48 03/13/2024 08:10 AM    TRIGLYCERIDE 75 03/13/2024 08:10 AM       Lab Results   Component Value Date/Time    MALBCRT 4934 (H) 09/14/2023 08:50 AM    MICROALBUR 115.8 09/14/2023 08:50 AM       Physical Examination:  Vital signs: BP (!) 177/88   Pulse 79  There is no height or weight on file to calculate BMI.    Assessment and Plan:    1. DM2  Discussed Goals: FBG 80 - 130, 2hPP < 180, a1c < 7.0%  Last a1c drawn on 7/25/24 was 5.6%, which is at goal and decreased further from last (5.8% on 3/2024). Commended pt on DM control as A1c now WNL.  Pt reported SMBG mostly at goal  Pt is tolerating his current medication regimen well w/ no issues or SE.  Continues to note appetite suppression w/ current DM regimen.  Has not used his basal insulin in months.  Of note, pt was able to increase his lisinopril/hctz dose up as instructed at last visit. No issues w/ hypotension since last visit. Does not monitor BP at home - recommended to start doing so.  Pt's BP was elevated today in clinic - he was agitated d/t clinic running behind d/t [facility] downtime.  Pt did not take his BP medications prior to appt today. Counseled to do so prior to next appt.    - Medication changes:  DC Toujeo  - Continue:  Metformin 500mg bid (renally dose adjusted) - will need to CTM   Ozempic 1mg q 7 days    - Lifestyle changes:  Diet: Maximize lean proteins and veggies. Cut out/down on carbs. Avoid simple sugars.   Exercise: Increase as tolerated. Aim for at least 150 min/week of anything aerobic.    - Preventative management:  REC DM Score: 1  Care gaps  addressed:   BP is >140/90, which is above goal  Pt is on lisinopril/HCTZ 40mg/50mg daily and amlodipine 10mg daily  Care gaps updated in Health Maintenance    Follow Up:  ~ 4 weeks for initial HTN visit    Alex Singh PharmD, BCACP    CC:   Jonathan Mazariegos M.D.

## 2024-09-24 RX ORDER — APIXABAN 5 MG/1
5 TABLET, FILM COATED ORAL 2 TIMES DAILY
Qty: 60 TABLET | Refills: 6 | Status: SHIPPED | OUTPATIENT
Start: 2024-09-24

## 2024-09-24 NOTE — TELEPHONE ENCOUNTER
Received request via: Pharmacy    Was the patient seen in the last year in this department? Yes    Does the patient have an active prescription (recently filled or refills available) for medication(s) requested? No    Pharmacy Name: [facility]    Does the patient have care home Plus and need 100-day supply? (This applies to ALL medications) Patient does not have SCP

## 2024-09-25 DIAGNOSIS — E11.59 HYPERTENSION ASSOCIATED WITH DIABETES (HCC): ICD-10-CM

## 2024-09-25 DIAGNOSIS — I15.2 HYPERTENSION ASSOCIATED WITH DIABETES (HCC): ICD-10-CM

## 2024-09-25 RX ORDER — AMLODIPINE BESYLATE 10 MG/1
10 TABLET ORAL EVERY EVENING
Qty: 90 TABLET | Refills: 3 | Status: SHIPPED | OUTPATIENT
Start: 2024-09-25

## 2024-10-02 ENCOUNTER — DOCUMENTATION (OUTPATIENT)
Dept: VASCULAR LAB | Facility: MEDICAL CENTER | Age: 74
End: 2024-10-02
Payer: MEDICARE

## 2024-10-27 DIAGNOSIS — E11.9 TYPE 2 DIABETES MELLITUS WITHOUT COMPLICATION, WITHOUT LONG-TERM CURRENT USE OF INSULIN (HCC): ICD-10-CM

## 2024-10-28 RX ORDER — SEMAGLUTIDE 1.34 MG/ML
1 INJECTION, SOLUTION SUBCUTANEOUS
Qty: 3 ML | Refills: 1 | Status: SHIPPED | OUTPATIENT
Start: 2024-10-28

## 2024-10-30 ENCOUNTER — DOCUMENTATION (OUTPATIENT)
Dept: VASCULAR LAB | Facility: MEDICAL CENTER | Age: 74
End: 2024-10-30
Payer: MEDICARE

## 2024-10-30 ENCOUNTER — TELEPHONE (OUTPATIENT)
Dept: VASCULAR LAB | Facility: MEDICAL CENTER | Age: 74
End: 2024-10-30
Payer: MEDICARE

## 2024-11-20 ENCOUNTER — OFFICE VISIT (OUTPATIENT)
Dept: MEDICAL GROUP | Facility: PHYSICIAN GROUP | Age: 74
End: 2024-11-20
Payer: MEDICARE

## 2024-11-20 VITALS
OXYGEN SATURATION: 98 % | BODY MASS INDEX: 35.11 KG/M2 | HEIGHT: 71 IN | DIASTOLIC BLOOD PRESSURE: 84 MMHG | SYSTOLIC BLOOD PRESSURE: 126 MMHG | WEIGHT: 250.8 LBS | TEMPERATURE: 98.4 F | HEART RATE: 94 BPM

## 2024-11-20 DIAGNOSIS — Z86.73 HISTORY OF STROKE: ICD-10-CM

## 2024-11-20 DIAGNOSIS — N18.31 STAGE 3A CHRONIC KIDNEY DISEASE: Chronic | ICD-10-CM

## 2024-11-20 DIAGNOSIS — R80.0 ISOLATED PROTEINURIA WITHOUT SPECIFIC MORPHOLOGIC LESION: Chronic | ICD-10-CM

## 2024-11-20 DIAGNOSIS — Z23 NEED FOR VACCINATION: ICD-10-CM

## 2024-11-20 DIAGNOSIS — E11.42 DIABETIC POLYNEUROPATHY ASSOCIATED WITH TYPE 2 DIABETES MELLITUS (HCC): Chronic | ICD-10-CM

## 2024-11-20 DIAGNOSIS — B35.1 ONYCHOMYCOSIS: ICD-10-CM

## 2024-11-20 DIAGNOSIS — E11.69 DYSLIPIDEMIA DUE TO TYPE 2 DIABETES MELLITUS (HCC): Chronic | ICD-10-CM

## 2024-11-20 DIAGNOSIS — E78.5 DYSLIPIDEMIA DUE TO TYPE 2 DIABETES MELLITUS (HCC): Chronic | ICD-10-CM

## 2024-11-20 DIAGNOSIS — E66.01 SEVERE OBESITY (HCC): Chronic | ICD-10-CM

## 2024-11-20 DIAGNOSIS — I48.91 ATRIAL FIBRILLATION, UNSPECIFIED TYPE (HCC): Chronic | ICD-10-CM

## 2024-11-20 DIAGNOSIS — I15.2 HYPERTENSION ASSOCIATED WITH DIABETES (HCC): Chronic | ICD-10-CM

## 2024-11-20 DIAGNOSIS — E11.59 HYPERTENSION ASSOCIATED WITH DIABETES (HCC): Chronic | ICD-10-CM

## 2024-11-20 PROBLEM — M79.89 LEG SWELLING: Status: RESOLVED | Noted: 2021-04-15 | Resolved: 2024-11-20

## 2024-11-20 PROBLEM — Z79.4 INSULIN LONG-TERM USE (HCC): Chronic | Status: RESOLVED | Noted: 2022-07-18 | Resolved: 2024-11-20

## 2024-11-20 PROCEDURE — 3074F SYST BP LT 130 MM HG: CPT | Performed by: INTERNAL MEDICINE

## 2024-11-20 PROCEDURE — 99215 OFFICE O/P EST HI 40 MIN: CPT | Performed by: INTERNAL MEDICINE

## 2024-11-20 PROCEDURE — 3079F DIAST BP 80-89 MM HG: CPT | Performed by: INTERNAL MEDICINE

## 2024-11-20 ASSESSMENT — FIBROSIS 4 INDEX: FIB4 SCORE: 1.78

## 2024-11-20 NOTE — ASSESSMENT & PLAN NOTE
Chronic condition.  Previous GFR in the 30s.  I previously referred the patient to nephrology however the patient refused to go see a specialist.  Recommend to continue to monitor blood test.

## 2024-11-20 NOTE — ASSESSMENT & PLAN NOTE
Chronic condition.  Noted in several toenails.  Patient has been trimming his nails on his own.  Patient denied pain or discomfort.  No new symptoms reported.

## 2024-11-20 NOTE — ASSESSMENT & PLAN NOTE
Chronic condition.  The patient is currently taking lisinopril.  Blood pressure has been well-controlled at home per patient report.  No side effects reported.

## 2024-11-20 NOTE — ASSESSMENT & PLAN NOTE
Chronic condition.  Followed by cardiology service.  Patient currently taking aspirin 81 Mg daily.  The patient denies chest pain shortness of breath palpitation or near syncope.

## 2024-11-20 NOTE — PROGRESS NOTES
PRIMARY CARE CLINIC VISIT        Chief Complaint   Patient presents with    Follow-Up     Check up      Follow-up diabetes  Atrial fibrillation  CKD 3  Obesity  Hyperlipidemia  Follow-up hypertension  Proteinuria  Test result  Medication review  Vaccination status  Onychomycosis    History of Present Illness     Severe obesity (HCC)  Chronic condition.  With Ozempic therapy the patient stated that he has lost over 30 pounds p since the last several months.  The patient is very happy with the result.  No new symptoms reported.    Need for vaccination  Rec but pt declined flu shot    Diabetic neuropathy associated with type 2 diabetes mellitus (HCC)  This is a chronic condition.  The patient presently being treated with Ozempic 1 mg injection every 7 days and metformin 500 mg twice daily.    Patient had A1c done recently which was below 6%.  Patient denies significant hypoglycemia.    Due to underlying CKD 3 with GFR in the 30s I recommend patient to discontinue metformin completely.    Atrial fibrillation (HCC)  Chronic condition.  Followed by cardiology service.  Patient currently taking aspirin 81 Mg daily.  The patient denies chest pain shortness of breath palpitation or near syncope.    CKD (chronic kidney disease) stage 3, GFR 30-59 ml/min (HCC)  Chronic condition.  Previous GFR in the 30s.  I previously referred the patient to nephrology however the patient refused to go see a specialist.  Recommend to continue to monitor blood test.    Dyslipidemia due to type 2 diabetes mellitus (HCC)  Chronic condition.  The patient presently taking atorvastatin.  Recent lipid panel result discussed with the patient.  Tolerating medication well.    Hypertension associated with diabetes (HCC)  Chronic condition.  The patient is currently taking lisinopril.  Blood pressure has been well-controlled at home per patient report.  No side effects reported.    Proteinuria  Chronic condition associated with CKD 3.  Lab test ordered  for follow-up.  Patient currently asymptomatic.    History of stroke  This is a chronic condition.  The patient presently taking aspirin 81 Mg daily.  No history of bleeding reported.  No new symptoms reported by the patient.    Onychomycosis  Chronic condition.  Noted in several toenails.  Patient has been trimming his nails on his own.  Patient denied pain or discomfort.  No new symptoms reported.    Current Outpatient Medications on File Prior to Visit   Medication Sig Dispense Refill    OZEMPIC, 1 MG/DOSE, 4 MG/3ML Solution Pen-injector INJECT 1 MG UNDER THE SKIN EVERY 7 DAYS 3 mL 1    amLODIPine (NORVASC) 10 MG Tab TAKE 1 TABLET BY MOUTH EVERY DAY IN THE EVENING 90 Tablet 3    apixaban (ELIQUIS) 5mg Tab TAKE 1 TABLET BY MOUTH TWICE A DAY 60 Tablet 6    lisinopril-hydrochlorothiazide (PRINZIDE) 20-25 MG per tablet Take 2 Tablets by mouth every day. 180 Tablet 1    Blood Glucose Test Strips FREESTYLE FREEDOM LITE . Check blood sugar 1x per day .  ICD10 code E11.69 100 Strip 3    atorvastatin (LIPITOR) 40 MG Tab Take 1 Tablet by mouth every day. 90 Tablet 3    Insulin Pen Needle 32 G x 4 mm For Toujeo BD  Sterile needles  0.23 x 4mm  32Gx 5/32 100 Each 6    Blood Glucose Test Strips ONE TOUCH test strips . Check blood sugar 1x per day and prn ssx of high or low sugar 200 Strip 3    aspirin EC (ECOTRIN) 81 MG Tablet Delayed Response Take 81 mg by mouth every day.      furosemide (LASIX) 20 MG Tab Take 1 Tablet by mouth every day. (Patient not taking: Reported on 11/20/2024) 30 Tablet 5     No current facility-administered medications on file prior to visit.        Allergies: Metformin    Current Outpatient Medications Ordered in Epic   Medication Sig Dispense Refill    OZEMPIC, 1 MG/DOSE, 4 MG/3ML Solution Pen-injector INJECT 1 MG UNDER THE SKIN EVERY 7 DAYS 3 mL 1    amLODIPine (NORVASC) 10 MG Tab TAKE 1 TABLET BY MOUTH EVERY DAY IN THE EVENING 90 Tablet 3    apixaban (ELIQUIS) 5mg Tab TAKE 1 TABLET BY MOUTH TWICE  A DAY 60 Tablet 6    lisinopril-hydrochlorothiazide (PRINZIDE) 20-25 MG per tablet Take 2 Tablets by mouth every day. 180 Tablet 1    Blood Glucose Test Strips FREESTYLE FREEDOM LITE . Check blood sugar 1x per day .  ICD10 code E11.69 100 Strip 3    atorvastatin (LIPITOR) 40 MG Tab Take 1 Tablet by mouth every day. 90 Tablet 3    Insulin Pen Needle 32 G x 4 mm For Toujeo BD  Sterile needles  0.23 x 4mm  32Gx 5/32 100 Each 6    Blood Glucose Test Strips ONE TOUCH test strips . Check blood sugar 1x per day and prn ssx of high or low sugar 200 Strip 3    aspirin EC (ECOTRIN) 81 MG Tablet Delayed Response Take 81 mg by mouth every day.      furosemide (LASIX) 20 MG Tab Take 1 Tablet by mouth every day. (Patient not taking: Reported on 11/20/2024) 30 Tablet 5     No current Epic-ordered facility-administered medications on file.       Past Medical History:   Diagnosis Date    Hypertension     Type II or unspecified type diabetes mellitus without mention of complication, not stated as uncontrolled        History reviewed. No pertinent surgical history.    History reviewed. No pertinent family history.    Social History     Tobacco Use   Smoking Status Never   Smokeless Tobacco Never       Social History     Substance and Sexual Activity   Alcohol Use Yes    Comment: occ       Review of systems  As per HPI above. All other systems reviewed and negative.      Past Medical, Social, and Family history reviewed and updated in [facility]       LAB DATA:     I have independently reviewed / interpreted labs    Lab Results   Component Value Date/Time    HBA1C 5.6 07/25/2024 07:38 AM    HBA1C 5.8 (H) 03/13/2024 08:10 AM    HBA1C 5.9 (A) 03/06/2024 12:00 AM        Lab Results   Component Value Date/Time    WBC 10.4 09/14/2023 08:21 AM    HEMOGLOBIN 16.4 09/14/2023 08:21 AM    HEMATOCRIT 48.1 09/14/2023 08:21 AM    MCV 88.9 09/14/2023 08:21 AM    PLATELETCT 235 09/14/2023 08:21 AM       Lab Results   Component Value Date/Time    SODIUM 138  "07/25/2024 07:38 AM    POTASSIUM 4.3 07/25/2024 07:38 AM    GLUCOSE 115 (H) 07/25/2024 07:38 AM    BUN 29 (H) 07/25/2024 07:38 AM    CREATININE 1.91 (H) 07/25/2024 07:38 AM       Lab Results   Component Value Date/Time    CHOLSTRLTOT 137 03/13/2024 08:10 AM    TRIGLYCERIDE 75 03/13/2024 08:10 AM    HDL 48 03/13/2024 08:10 AM    LDL 74 03/13/2024 08:10 AM       Lab Results   Component Value Date/Time    ALTSGPT 8 07/25/2024 07:38 AM          Objective     /84 (BP Location: Right arm, Patient Position: Sitting, BP Cuff Size: Adult)   Pulse 94   Temp 36.9 °C (98.4 °F) (Temporal)   Ht 1.803 m (5' 11\")   Wt 114 kg (250 lb 12.8 oz)   SpO2 98%    Body mass index is 34.98 kg/m².    General: alert in no apparent distress.  Cardiovascular: regular rate and rhythm  Pulmonary: lungs : no wheezing   Gastrointestinal: BS present.   Monofilament testing with a 10 gram force: sensation intact: decreased bilaterally  Visual Inspection: Feet without maceration, ulcers, fissures.  Pedal pulses: decreased bilaterally   Toenails are thickened with hyperpigmented discoloration.  Soft tissue without pain or discomfort no discharge noted.  Assessment and Plan     1. Diabetic polyneuropathy associated with type 2 diabetes mellitus (HCC)  Chronic condition.  Excellent control.  A1c 5.6%.  Due to underlying CKD 3 have recommended patient to discontinue metformin.  Patient to continue with Ozempic injection 1 mg injection every 7 days.  Discussed with the patient goals for Diabetes management:  -HbA1C < 7% /  Fasting BG   /  2 hrs postprandial  - 180    Pt's education:   -Discussed with potential microvascular/macrovascular complications of diabetes  -The importance of increasing physical activity to improve diabetes control  discussed with the patient.   -Patient was also educated on changing diet and making better choices to help control blood sugar.            - HEMOGLOBIN A1C; Future  - Basic Metabolic Panel; " Future  - Diabetic Monofilament LE Exam    2. Atrial fibrillation, unspecified type (HCC)  Chronic condition.  Heart rate is acceptable.  Patient asymptomatic.  Continue Eliquis 5 mg twice daily.  Continue follow-up with cardiology service as directed.    3. Stage 3a chronic kidney disease  Chronic condition.  Recent GFR in the 30s.  Advised the patient to avoid NSAID.  As above the patient refused to follow-up with nephrologist.  Continue to monitor.    4. Severe obesity (HCC)  Chronic condition.  Improved status.  The patient has lost over 30 pounds with Ozempic treatment.  Recommend to continue with diet and exercise.  The patient will congratulated.    5. Dyslipidemia due to type 2 diabetes mellitus ([facility])  - ALANINE AMINO-TRANS; Future  - Lipid Profile; Future  Chronic condition.  Stable.  Continue atorvastatin 40 Mg daily.  Recent lab test result discussed with the patient.      6. Hypertension associated with diabetes ([facility])  - CBC WITH DIFFERENTIAL; Future  - MICROALBUMIN CREAT RATIO URINE; Future  Chronic stable.  Continue lisinopril 20 hydrochlorothiazide 25 mg   Continue to monitor blood pressure on a regular basis.      7. Proteinuria  - PROTEIN/CREAT RATIO URINE; Future  Chronic condition.  Associated with CKD 3.  Current status unclear.  Urine protein to creatinine ratio ordered.  Recommend low protein diet.      8. Need for vaccination  Today recommended routine vaccination including flu shot and shingle vaccine pneumonia shot   However the patient refused all vaccines.      9. History of stroke  Chronic condition.  Stable.  Continue aspirin 81 Mg daily.  Continue to monitor    10. Onychomycosis  Chronic condition.  Patient asymptomatic.  Patient declined podiatry referral.  Continue to monitor.      Patient has pending appointment to see ophthalmologist in a couple of weeks.        Time spent:   42  minutes     Reviewed medical records including:  February 14, 2024 medical office note  September 14,  2023 medical office note  August 30, 2022 wound care note  August 9, 2022 wound care note  July 18, 2022 medical office note  October 28, 2021 medical office note    That includes face to face time and non-face to face time.  Chart review before the visit, the actual patient visit, and time spent on documentation in the EMR after the visit.  Chart review/prep, review of other providers' records, Independent review of imagings/labs ,  time for history/examination , pt's counseling/education, ordering, prescribing, treatment plan discussed with patient, and care coordination.          Please note that this dictation was created using voice recognition software. I have made every reasonable attempt to correct obvious errors, but I expect that there are errors of grammar and possibly content that I did not discover before finalizing the note.    Jonathan Mazariegos MD  Internal Medicine  [facility]

## 2024-11-20 NOTE — ASSESSMENT & PLAN NOTE
Chronic condition.  The patient presently taking atorvastatin.  Recent lipid panel result discussed with the patient.  Tolerating medication well.

## 2024-11-20 NOTE — ASSESSMENT & PLAN NOTE
This is a chronic condition.  The patient presently being treated with Ozempic 1 mg injection every 7 days and metformin 500 mg twice daily.    Patient had A1c done recently which was below 6%.  Patient denies significant hypoglycemia.    Due to underlying CKD 3 with GFR in the 30s I recommend patient to discontinue metformin completely.

## 2024-11-20 NOTE — ASSESSMENT & PLAN NOTE
Chronic condition associated with CKD 3.  Lab test ordered for follow-up.  Patient currently asymptomatic.

## 2024-11-20 NOTE — ASSESSMENT & PLAN NOTE
Chronic condition.  With Ozempic therapy the patient stated that he has lost over 30 pounds p since the last several months.  The patient is very happy with the result.  No new symptoms reported.

## 2024-11-20 NOTE — ASSESSMENT & PLAN NOTE
This is a chronic condition.  The patient presently taking aspirin 81 Mg daily.  No history of bleeding reported.  No new symptoms reported by the patient.

## 2024-12-20 ENCOUNTER — OFFICE VISIT (OUTPATIENT)
Dept: URGENT CARE | Facility: PHYSICIAN GROUP | Age: 74
End: 2024-12-20
Payer: MEDICARE

## 2024-12-20 VITALS
RESPIRATION RATE: 18 BRPM | HEIGHT: 71 IN | WEIGHT: 240 LBS | OXYGEN SATURATION: 93 % | SYSTOLIC BLOOD PRESSURE: 124 MMHG | TEMPERATURE: 97.4 F | HEART RATE: 89 BPM | BODY MASS INDEX: 33.6 KG/M2 | DIASTOLIC BLOOD PRESSURE: 72 MMHG

## 2024-12-20 DIAGNOSIS — J06.9 VIRAL URI WITH COUGH: Primary | ICD-10-CM

## 2024-12-20 LAB
FLUAV RNA SPEC QL NAA+PROBE: NEGATIVE
FLUBV RNA SPEC QL NAA+PROBE: NEGATIVE
RSV RNA SPEC QL NAA+PROBE: NEGATIVE
SARS-COV-2 RNA RESP QL NAA+PROBE: NEGATIVE

## 2024-12-20 PROCEDURE — 99213 OFFICE O/P EST LOW 20 MIN: CPT | Performed by: PHYSICIAN ASSISTANT

## 2024-12-20 PROCEDURE — 3074F SYST BP LT 130 MM HG: CPT | Performed by: PHYSICIAN ASSISTANT

## 2024-12-20 PROCEDURE — 3078F DIAST BP <80 MM HG: CPT | Performed by: PHYSICIAN ASSISTANT

## 2024-12-20 PROCEDURE — 0241U POCT CEPHEID COV-2, FLU A/B, RSV - PCR: CPT | Performed by: PHYSICIAN ASSISTANT

## 2024-12-20 ASSESSMENT — FIBROSIS 4 INDEX: FIB4 SCORE: 1.78

## 2024-12-20 NOTE — PROGRESS NOTES
"Subjective:   Bennett Callahan is a 74 y.o. male who presents for Shortness of Breath (Runny nose, body aches, fatigue /X 4 days /Advil cold and flu, this AM )      HPI  The patient presents to the Urgent Care with complaints of head cold symptoms for 4 days.  He reports of nasal congestion, runny nose, fatigue, mild intermittent cough, body aches.  Reports of shortness of breath with walking. Denies any fever, chills, chest pain, vomiting, diarrhea. Tolerating fluids well. Decreased appetite.  States his glucose levels are stable.    Past Medical History:   Diagnosis Date    Hypertension     Type II or unspecified type diabetes mellitus without mention of complication, not stated as uncontrolled      Allergies   Allergen Reactions    Metformin         Objective:     /72   Pulse 89   Temp 36.3 °C (97.4 °F) (Temporal)   Resp 18   Ht 1.803 m (5' 11\")   Wt 109 kg (240 lb)   SpO2 93%   BMI 33.47 kg/m²     Physical Exam  Vitals reviewed.   Constitutional:       General: He is not in acute distress.     Appearance: Normal appearance. He is not ill-appearing or toxic-appearing.   HENT:      Mouth/Throat:      Mouth: Mucous membranes are moist.      Pharynx: Oropharynx is clear. Uvula midline. Posterior oropharyngeal erythema (trace) present. No pharyngeal swelling, oropharyngeal exudate or uvula swelling.      Tonsils: No tonsillar exudate or tonsillar abscesses.   Eyes:      Conjunctiva/sclera: Conjunctivae normal.   Cardiovascular:      Rate and Rhythm: Normal rate and regular rhythm.      Heart sounds: Normal heart sounds.   Pulmonary:      Effort: Pulmonary effort is normal. No respiratory distress.      Breath sounds: Normal breath sounds. No wheezing, rhonchi or rales.   Musculoskeletal:      Cervical back: Neck supple. No rigidity.   Skin:     General: Skin is warm and dry.   Neurological:      General: No focal deficit present.      Mental Status: He is alert and oriented to person, place, and " time.   Psychiatric:         Mood and Affect: Mood normal.         Behavior: Behavior normal.       Results for orders placed or performed in visit on 12/20/24   POCT CEPHEID COV-2, FLU A/B, RSV - PCR    Collection Time: 12/20/24 12:32 PM   Result Value Ref Range    SARS-CoV-2 by PCR Negative Negative, Invalid    Influenza virus A RNA Negative Negative, Invalid    Influenza virus B, PCR Negative Negative, Invalid    RSV, PCR Negative Negative, Invalid         Diagnosis and associated orders:     1. Viral URI with cough  - POCT CEPHEID COV-2, FLU A/B, RSV - PCR       Comments/MDM:     The patient's presenting symptoms and exam findings are consistent with a upper respiratory infection most likely viral etiology. They have a normal pulse oximetry on room air, afebrile, and a normal pulmonary exam. Overall, the patient is very well appearing. I do not feel that this patient would benefit from antibiotics at this time.   Recommended symptomatic and supportive care at this time that includes plenty of fluids, rest, Tylenol/Ibuprofen for pain/fever, warm salt water gargles for sore throat, OTC cough and decongestant medication, Flonase, nasal saline washes. If no improvement in 5-7 days or any worsening symptoms, recommend returning to the urgent care for re-evaluation.          I personally reviewed prior external notes and test results pertinent to today's visit. Pathogenesis of diagnosis discussed including typical length and natural progression. Supportive care, natural history, differential diagnoses, and indications for immediate follow-up discussed. Patient expresses understanding and agrees to plan. Patient denies any other questions or concerns.     Follow-up with the primary care physician for recheck, reevaluation, and consideration of further management.    Please note that this dictation was created using voice recognition software. I have made a reasonable attempt to correct obvious errors, but I expect that  there are errors of grammar and possibly content that I did not discover before finalizing the note.    This note was electronically signed by Charanjit Bright PA-C

## 2025-01-03 DIAGNOSIS — E11.59 HYPERTENSION ASSOCIATED WITH DIABETES (HCC): ICD-10-CM

## 2025-01-03 DIAGNOSIS — I15.2 HYPERTENSION ASSOCIATED WITH DIABETES (HCC): ICD-10-CM

## 2025-01-03 RX ORDER — LISINOPRIL AND HYDROCHLOROTHIAZIDE 20; 25 MG/1; MG/1
2 TABLET ORAL DAILY
Qty: 180 TABLET | Refills: 0 | Status: SHIPPED | OUTPATIENT
Start: 2025-01-03

## 2025-01-29 DIAGNOSIS — E11.9 TYPE 2 DIABETES MELLITUS WITHOUT COMPLICATION, WITHOUT LONG-TERM CURRENT USE OF INSULIN (HCC): ICD-10-CM

## 2025-01-30 RX ORDER — SEMAGLUTIDE 1.34 MG/ML
1 INJECTION, SOLUTION SUBCUTANEOUS
Qty: 3 ML | Refills: 1 | Status: SHIPPED | OUTPATIENT
Start: 2025-01-30

## 2025-01-31 DIAGNOSIS — E11.9 TYPE 2 DIABETES MELLITUS WITHOUT COMPLICATION, WITHOUT LONG-TERM CURRENT USE OF INSULIN (HCC): ICD-10-CM

## 2025-02-04 RX ORDER — SEMAGLUTIDE 1.34 MG/ML
1 INJECTION, SOLUTION SUBCUTANEOUS
Qty: 9 ML | Refills: 1 | Status: SHIPPED | OUTPATIENT
Start: 2025-02-04

## 2025-02-18 ENCOUNTER — OFFICE VISIT (OUTPATIENT)
Dept: MEDICAL GROUP | Facility: PHYSICIAN GROUP | Age: 75
End: 2025-02-18
Payer: MEDICARE

## 2025-02-18 VITALS
HEART RATE: 90 BPM | TEMPERATURE: 98.4 F | DIASTOLIC BLOOD PRESSURE: 78 MMHG | SYSTOLIC BLOOD PRESSURE: 130 MMHG | WEIGHT: 257.5 LBS | RESPIRATION RATE: 16 BRPM | BODY MASS INDEX: 36.05 KG/M2 | OXYGEN SATURATION: 99 % | HEIGHT: 71 IN

## 2025-02-18 DIAGNOSIS — I48.91 ATRIAL FIBRILLATION, UNSPECIFIED TYPE (HCC): Chronic | ICD-10-CM

## 2025-02-18 DIAGNOSIS — I15.2 HYPERTENSION ASSOCIATED WITH DIABETES (HCC): Chronic | ICD-10-CM

## 2025-02-18 DIAGNOSIS — E78.5 DYSLIPIDEMIA DUE TO TYPE 2 DIABETES MELLITUS (HCC): Chronic | ICD-10-CM

## 2025-02-18 DIAGNOSIS — E11.42 DIABETIC POLYNEUROPATHY ASSOCIATED WITH TYPE 2 DIABETES MELLITUS (HCC): Chronic | ICD-10-CM

## 2025-02-18 DIAGNOSIS — E11.69 DYSLIPIDEMIA DUE TO TYPE 2 DIABETES MELLITUS (HCC): Chronic | ICD-10-CM

## 2025-02-18 DIAGNOSIS — R94.31 ABNORMAL EKG: ICD-10-CM

## 2025-02-18 DIAGNOSIS — Z01.818 PREOP EXAMINATION: ICD-10-CM

## 2025-02-18 DIAGNOSIS — R80.0 ISOLATED PROTEINURIA WITHOUT SPECIFIC MORPHOLOGIC LESION: Chronic | ICD-10-CM

## 2025-02-18 DIAGNOSIS — E11.59 HYPERTENSION ASSOCIATED WITH DIABETES (HCC): Chronic | ICD-10-CM

## 2025-02-18 DIAGNOSIS — N18.31 STAGE 3A CHRONIC KIDNEY DISEASE: Chronic | ICD-10-CM

## 2025-02-18 LAB
HBA1C MFR BLD: 5.9 % (ref ?–5.8)
POCT INT CON NEG: NEGATIVE
POCT INT CON POS: POSITIVE

## 2025-02-18 PROCEDURE — 99215 OFFICE O/P EST HI 40 MIN: CPT | Performed by: INTERNAL MEDICINE

## 2025-02-18 PROCEDURE — 83036 HEMOGLOBIN GLYCOSYLATED A1C: CPT | Performed by: INTERNAL MEDICINE

## 2025-02-18 RX ORDER — AMOXICILLIN 500 MG/1
CAPSULE ORAL
COMMUNITY
Start: 2025-02-10 | End: 2025-02-18

## 2025-02-18 RX ORDER — FUROSEMIDE 20 MG/1
20 TABLET ORAL
Qty: 30 TABLET | Refills: 6 | Status: SHIPPED | OUTPATIENT
Start: 2025-02-18

## 2025-02-18 ASSESSMENT — FIBROSIS 4 INDEX: FIB4 SCORE: 1.81

## 2025-02-18 ASSESSMENT — PATIENT HEALTH QUESTIONNAIRE - PHQ9: CLINICAL INTERPRETATION OF PHQ2 SCORE: 0

## 2025-02-18 NOTE — ASSESSMENT & PLAN NOTE
Chronic ongoing condition.  The patient is taking atorvastatin daily.  No new symptoms reported.  Lab test ordered for follow-up.

## 2025-02-18 NOTE — ASSESSMENT & PLAN NOTE
This is a chronic condition.  Patient is taking amlodipine 10 mg daily and  lisinopril chlorothiazide.

## 2025-02-18 NOTE — PROGRESS NOTES
PREOPERATIVE EVALUATION / Risk stratification    -Name of surgeon requesting this evaluation: [facility] . Pt does not remember name of Dentist  -Date of surgery/procedure: TBD  -Planned surgery/procedure: full mouth extractions      Diabetic neuropathy associated with type 2 diabetes mellitus (HCC)  This is a chronic condition.  The patient currently being treated with Ozempic.  Patient tolerated the treatment well.  Patient denies significant hypoglycemia.  A1c in the office today 5.9%.    Atrial fibrillation (HCC)  Chronic condition.  Patient followed by cardiology service.  Patient presently taking Eliquis 5 mg twice daily  Patient denies chest pain shortness of breath palpitation or near syncope.      CKD (chronic kidney disease) stage 3, GFR 30-59 ml/min (HCC)  Chronic condition.  Previous GFR in the 30s.    Patient currently asymptomatic.  Lab test ordered for follow-up.    Proteinuria  This is a chronic condition but the patient with CKD 3 as above.  Lab test ordered to check urine protein to creatinine ratio.    Dyslipidemia due to type 2 diabetes mellitus (HCC)  Chronic ongoing condition.  The patient is taking atorvastatin daily.  No new symptoms reported.  Lab test ordered for follow-up.    Hypertension associated with diabetes (HCC)  This is a chronic condition.  Patient is taking amlodipine 10 mg daily and  lisinopril chlorothiazide.        REVISED CARDIAC RISK INDEX [RCRI] assessment:      Yes No   [] [x]   High risk surgery [vascular surgery, any open intraperitoneal surgery, any intrathoracic surgery] ?  [] [x]   Hx of ischemic heart disease ?   [] [x]   Hx of heart failure ?   [] [x]   Hx of CVA ?   [] [x]   Hx of diabetes requiring insulin treatment ?   [] [x]   Preop serum Cr > 2 mg /dL?      FUNCTIONAL CAPACITY assessment: Per pt's report patient       [x]   Can take care of self, such as eat, dress, or use the toilet (1 MET)  [x]   Can  walk on level ground at 3 to 4 mph (4  METS)        Allergies: Metformin    Current Outpatient Medications Ordered in Epic   Medication Sig Dispense Refill    furosemide (LASIX) 20 MG Tab Take 1 Tablet by mouth 1 time a day as needed (water retention). 30 Tablet 6    OZEMPIC, 1 MG/DOSE, 4 MG/3ML Solution Pen-injector INJECT 1 MG UNDER THE SKIN EVERY 7 DAYS 9 mL 1    lisinopril-hydrochlorothiazide (PRINZIDE) 20-25 MG per tablet Take 2 Tablets by mouth every day. Overdue for pharmacotherapy follow up, needs appt for further refills.  Call [phone] to schedule. 180 Tablet 0    amLODIPine (NORVASC) 10 MG Tab TAKE 1 TABLET BY MOUTH EVERY DAY IN THE EVENING 90 Tablet 3    Blood Glucose Test Strips FREESTYLE FREEDOM LITE . Check blood sugar 1x per day .  ICD10 code E11.69 100 Strip 3    atorvastatin (LIPITOR) 40 MG Tab Take 1 Tablet by mouth every day. 90 Tablet 3    Insulin Pen Needle 32 G x 4 mm For Toujeo BD  Sterile needles  0.23 x 4mm  32Gx 5/32 100 Each 6    Blood Glucose Test Strips ONE TOUCH test strips . Check blood sugar 1x per day and prn ssx of high or low sugar 200 Strip 3    aspirin EC (ECOTRIN) 81 MG Tablet Delayed Response Take 81 mg by mouth every day.      apixaban (ELIQUIS) 5mg Tab TAKE 1 TABLET BY MOUTH TWICE A DAY 60 Tablet 6     No current Epic-ordered facility-administered medications on file.       Past Medical History:   Diagnosis Date    Hypertension     Type II or unspecified type diabetes mellitus without mention of complication, not stated as uncontrolled        History reviewed. No pertinent surgical history.    History reviewed. No pertinent family history.    Social History     Tobacco Use   Smoking Status Never   Smokeless Tobacco Never       Social History     Substance and Sexual Activity   Alcohol Use Yes    Comment: occ       REVIEW OF SYSTEMS:     Constitutional:  no fever / chills   CV:  no chest pain, no palpitations  Pulmonary: no SOB    GI: no nausea / vomiting  :  no dysuria, no hematuria   Hematologic: no  bleeding    PHYSICAL EXAM:  Vitals:    02/18/25 0928   BP: 130/78   Pulse: 90   Resp: 16   Temp: 36.9 °C (98.4 °F)   SpO2: 99%     Body mass index is 35.91 kg/m².     Constitutional: alert in no apparent distress.  Neck: supple, no JVD  CV: heart irregular  Resp: lungs no wheezing   GI: BS present.   Neuro: CN 2-12 grossly intact      LABORATORY DATA:   EKG: I independently interpreted the patient's ekg :     Atrial fibrillation.  Rate 95 bpm  Nonspecific intraventricular conduction delay.  Inferior infarct, old.  Abnormal EKG  Result discussed with the patient.        ASSESSMENT / MEDICAL DECISION MAKING:  - This is a 75 y.o. pt presents today for preop evaluation:     Yes No    [] [x]   is this surgery is an emergency?       [] [x]   is pt with acute coronary syndrome now?     - Perioperative risk factors[s] [Revised Cardiac Risk Index] as noted above:   0    [David index:  Rate of cardiac death, nonfatal MI, and non fatal cardiac arrest: No risk factor = 0.4%,  1 risk = 1%,  2 risks = 2-4%,  3+ risks = 5.4% ]   [David index:  Rate of MI, pulmonary edema, ventricular fibrillation, primary cardiac arrest, and complete heart block: No risk factor = 0.5%,  1 risk = 1.3%, 2 risks = 3.6%,  3+ risks = 9.1%]    - The pt's estimated Functional Capacity :     4 METS approximately.    -Risk Assessment    []   Low  [x]   Intermediate  []   High        -Comments: This 75-year-old patient with atrial fibrillation, diabetic neuropathy, CKD 3, hypertension and hyperlipidemia presents today for preop evaluation.  EKG is Abnormal  Recommendation: Referred pt to Cardiology for further evaluation    With the above dental procedure [full mouth extraction] which is associated with bleeding : In general we would recommend patient to hold the aspirin 10 days before the procedure and to stop taking Eliquis 48 hours before the procedure.  Would like to get cardiology recommendation if it is safe to proceed with the  procedure            ----------------------------------------------------------------------------------------------------    1. Preop examination  - EKG  - Basic Metabolic Panel; Future  - CBC WITH DIFFERENTIAL; Future  - PT AND PTT    2. Diabetic polyneuropathy associated with type 2 diabetes mellitus (HCC)  Chronic stable condition per A1c 5.9%.  Continue Ozempic 1 mg injection every 7 days.  Patient denies any side effects.  Clinical notes:   -Discussed with the patient goals for Diabetes management:   HbA1C < 7% /  Fasting BG   /  2 hrs post meal BG below 160  -Reviewed pathophysiology of diabetes and the importance of glycemic control to reduce the risk of diabetes related complications   Microvascular: retinopathy, neuropathy, nephropathy  Macrovascular: heart attack, stroke, peripheral vascular dz  -Advised pt to monitor sugar closely  -Counseled pt the importance of recognizing and treating hypoglycemia.   -The importance of increasing physical activity to improve diabetes control  discussed with the patient.   -Patient was also educated on changing diet and making better choices to help control blood sugar.          - POCT Hemoglobin A1C  - POCT Retinal Eye Exam    3. Atrial fibrillation, unspecified type (HCC)  This is a chronic condition.  Patient recommended to hold the Eliquis 2 days before the procedure also hold the aspirin 10 days before the dental procedure.  Patient fully understand there is an associated increased risk of potential stroke during the period not taking the aspirin and Eliquis treatment.    4. Stage 3a chronic kidney disease  Chronic condition.  Current status unclear.  Lab test ordered to check GFR.  Advised the patient to avoid NSAIDs    5. Dyslipidemia due to type 2 diabetes mellitus (HCC)  - Lipid Profile; Future  Chronic condition.  Current status unclear.  Blood test ordered to check lipid panel.  Continue atorvastatin 40 Mg daily    6. Hypertension associated with  diabetes (HCC)  Chronic stable condition.  Continue amlodipine 10 mg daily.  Lisinopril 20 hydrochlorothiazide 25 mg daily    7. Proteinuria  Chronic condition.  Current status unclear.  Lab test ordered as below  Recommend to continue with low protein diet  - PROTEIN/CREAT RATIO URINE; Future  - MICROALBUMIN CREAT RATIO URINE; Future                  Time spent:   44   minutes -    Reviewed medical records including:  December 20, 2024 urgent care note  November 20, 2024 medical office note  February 14 2024 medical office note  September 14, 2023 medical office note  Laboratory data dated February 18, 2025, December 20, 2024, July 25, 2024, March 13, 2024, March 6, 2024    That includes face to face time and non-face to face time.  Time for chart review before the visit, the actual patient visit, and time spent on documentation in the EMR after the visit.  Chart review/prep, review of other providers' records, Independent review of imagings/labs ,  time for history/examination , pt's counseling/education, ordering, prescribing, treatment plan discussed with patient, and care coordination.         Please note that this dictation was created using voice recognition software. I have made every reasonable attempt to correct obvious errors, but I expect that there are errors of grammar and possibly content that I did not discover before finalizing the note.

## 2025-02-18 NOTE — ASSESSMENT & PLAN NOTE
Chronic condition.  Patient followed by cardiology service.  Patient presently taking Eliquis 5 mg twice daily  Patient denies chest pain shortness of breath palpitation or near syncope.

## 2025-02-18 NOTE — ASSESSMENT & PLAN NOTE
This is a chronic condition but the patient with CKD 3 as above.  Lab test ordered to check urine protein to creatinine ratio.

## 2025-02-18 NOTE — ASSESSMENT & PLAN NOTE
This is a chronic condition.  The patient currently being treated with Ozempic.  Patient tolerated the treatment well.  Patient denies significant hypoglycemia.  A1c in the office today 5.9%.

## 2025-02-18 NOTE — ASSESSMENT & PLAN NOTE
Chronic condition.  Previous GFR in the 30s.    Patient currently asymptomatic.  Lab test ordered for follow-up.

## 2025-02-20 ENCOUNTER — HOSPITAL ENCOUNTER (OUTPATIENT)
Dept: LAB | Facility: MEDICAL CENTER | Age: 75
End: 2025-02-20
Attending: INTERNAL MEDICINE
Payer: MEDICARE

## 2025-02-20 DIAGNOSIS — Z01.818 PREOP EXAMINATION: ICD-10-CM

## 2025-02-20 DIAGNOSIS — R80.0 ISOLATED PROTEINURIA WITHOUT SPECIFIC MORPHOLOGIC LESION: Chronic | ICD-10-CM

## 2025-02-20 DIAGNOSIS — E78.5 DYSLIPIDEMIA DUE TO TYPE 2 DIABETES MELLITUS (HCC): Chronic | ICD-10-CM

## 2025-02-20 DIAGNOSIS — E11.42 DIABETIC POLYNEUROPATHY ASSOCIATED WITH TYPE 2 DIABETES MELLITUS (HCC): Chronic | ICD-10-CM

## 2025-02-20 DIAGNOSIS — E11.69 DYSLIPIDEMIA DUE TO TYPE 2 DIABETES MELLITUS (HCC): Chronic | ICD-10-CM

## 2025-02-20 LAB
ANION GAP SERPL CALC-SCNC: 13 MMOL/L (ref 7–16)
APTT PPP: 33.2 SEC (ref 24.7–36)
BASOPHILS # BLD AUTO: 0.7 % (ref 0–1.8)
BASOPHILS # BLD: 0.07 K/UL (ref 0–0.12)
BUN SERPL-MCNC: 40 MG/DL (ref 8–22)
CALCIUM SERPL-MCNC: 9.1 MG/DL (ref 8.5–10.5)
CHLORIDE SERPL-SCNC: 106 MMOL/L (ref 96–112)
CHOLEST SERPL-MCNC: 184 MG/DL (ref 100–199)
CO2 SERPL-SCNC: 19 MMOL/L (ref 20–33)
CREAT SERPL-MCNC: 2.55 MG/DL (ref 0.5–1.4)
EOSINOPHIL # BLD AUTO: 0.66 K/UL (ref 0–0.51)
EOSINOPHIL NFR BLD: 7.1 % (ref 0–6.9)
ERYTHROCYTE [DISTWIDTH] IN BLOOD BY AUTOMATED COUNT: 45.5 FL (ref 35.9–50)
GFR SERPLBLD CREATININE-BSD FMLA CKD-EPI: 26 ML/MIN/1.73 M 2
GLUCOSE SERPL-MCNC: 137 MG/DL (ref 65–99)
HCT VFR BLD AUTO: 40.1 % (ref 42–52)
HDLC SERPL-MCNC: 39 MG/DL
HGB BLD-MCNC: 13.2 G/DL (ref 14–18)
IMM GRANULOCYTES # BLD AUTO: 0.02 K/UL (ref 0–0.11)
IMM GRANULOCYTES NFR BLD AUTO: 0.2 % (ref 0–0.9)
INR PPP: 1.16 (ref 0.87–1.13)
LDLC SERPL CALC-MCNC: 126 MG/DL
LYMPHOCYTES # BLD AUTO: 2.7 K/UL (ref 1–4.8)
LYMPHOCYTES NFR BLD: 28.9 % (ref 22–41)
MCH RBC QN AUTO: 28.8 PG (ref 27–33)
MCHC RBC AUTO-ENTMCNC: 32.9 G/DL (ref 32.3–36.5)
MCV RBC AUTO: 87.4 FL (ref 81.4–97.8)
MONOCYTES # BLD AUTO: 0.79 K/UL (ref 0–0.85)
MONOCYTES NFR BLD AUTO: 8.5 % (ref 0–13.4)
NEUTROPHILS # BLD AUTO: 5.1 K/UL (ref 1.82–7.42)
NEUTROPHILS NFR BLD: 54.6 % (ref 44–72)
NRBC # BLD AUTO: 0 K/UL
NRBC BLD-RTO: 0 /100 WBC (ref 0–0.2)
PLATELET # BLD AUTO: 259 K/UL (ref 164–446)
PMV BLD AUTO: 9.2 FL (ref 9–12.9)
POTASSIUM SERPL-SCNC: 4.4 MMOL/L (ref 3.6–5.5)
PROTHROMBIN TIME: 14.8 SEC (ref 12–14.6)
RBC # BLD AUTO: 4.59 M/UL (ref 4.7–6.1)
SODIUM SERPL-SCNC: 138 MMOL/L (ref 135–145)
TRIGL SERPL-MCNC: 93 MG/DL (ref 0–149)
WBC # BLD AUTO: 9.3 K/UL (ref 4.8–10.8)

## 2025-02-20 PROCEDURE — 84156 ASSAY OF PROTEIN URINE: CPT

## 2025-02-20 PROCEDURE — 82570 ASSAY OF URINE CREATININE: CPT

## 2025-02-20 PROCEDURE — 85025 COMPLETE CBC W/AUTO DIFF WBC: CPT

## 2025-02-20 PROCEDURE — 82043 UR ALBUMIN QUANTITATIVE: CPT

## 2025-02-20 PROCEDURE — 85730 THROMBOPLASTIN TIME PARTIAL: CPT | Mod: GA

## 2025-02-20 PROCEDURE — 36415 COLL VENOUS BLD VENIPUNCTURE: CPT

## 2025-02-20 PROCEDURE — 85610 PROTHROMBIN TIME: CPT | Mod: GA

## 2025-02-20 PROCEDURE — 80061 LIPID PANEL: CPT

## 2025-02-20 PROCEDURE — 80048 BASIC METABOLIC PNL TOTAL CA: CPT

## 2025-02-21 ENCOUNTER — RESULTS FOLLOW-UP (OUTPATIENT)
Dept: MEDICAL GROUP | Facility: PHYSICIAN GROUP | Age: 75
End: 2025-02-21
Payer: MEDICARE

## 2025-02-21 ENCOUNTER — TELEPHONE (OUTPATIENT)
Dept: HEALTH INFORMATION MANAGEMENT | Facility: OTHER | Age: 75
End: 2025-02-21
Payer: MEDICARE

## 2025-02-21 DIAGNOSIS — I15.2 HYPERTENSION ASSOCIATED WITH DIABETES (HCC): ICD-10-CM

## 2025-02-21 DIAGNOSIS — E11.59 HYPERTENSION ASSOCIATED WITH DIABETES (HCC): ICD-10-CM

## 2025-02-21 DIAGNOSIS — N18.31 STAGE 3A CHRONIC KIDNEY DISEASE: Chronic | ICD-10-CM

## 2025-02-21 DIAGNOSIS — R80.0 ISOLATED PROTEINURIA WITHOUT SPECIFIC MORPHOLOGIC LESION: Chronic | ICD-10-CM

## 2025-02-21 LAB
CREAT UR-MCNC: 69.6 MG/DL
CREAT UR-MCNC: 72 MG/DL
MICROALBUMIN UR-MCNC: 112 MG/DL
MICROALBUMIN/CREAT UR: 1609 MG/G (ref 0–30)
PROT UR-MCNC: 156 MG/DL (ref 0–15)
PROT/CREAT UR: 2167 MG/G (ref 15–68)

## 2025-02-21 RX ORDER — ATORVASTATIN CALCIUM 40 MG/1
40 TABLET, FILM COATED ORAL DAILY
Qty: 100 TABLET | Refills: 3 | Status: SHIPPED | OUTPATIENT
Start: 2025-02-21

## 2025-02-21 RX ORDER — LISINOPRIL AND HYDROCHLOROTHIAZIDE 20; 25 MG/1; MG/1
1 TABLET ORAL
Qty: 100 TABLET | Refills: 3 | Status: SHIPPED | OUTPATIENT
Start: 2025-02-21

## 2025-02-21 NOTE — TELEPHONE ENCOUNTER
Received request via: Pharmacy    Was the patient seen in the last year in this department? Yes    Does the patient have an active prescription (recently filled or refills available) for medication(s) requested? No    Pharmacy Name: [facility]/pharmacy #[id number] - [place], [place] - [address] AT [address]     Does the patient have nursing home Plus and need 100-day supply? (This applies to ALL medications) Patient does not have SCP

## 2025-03-17 ENCOUNTER — TELEPHONE (OUTPATIENT)
Dept: CARDIOLOGY | Facility: MEDICAL CENTER | Age: 75
End: 2025-03-17
Payer: MEDICARE

## 2025-03-25 ENCOUNTER — OFFICE VISIT (OUTPATIENT)
Dept: CARDIOLOGY | Facility: MEDICAL CENTER | Age: 75
End: 2025-03-25
Attending: INTERNAL MEDICINE
Payer: MEDICARE

## 2025-03-25 VITALS
DIASTOLIC BLOOD PRESSURE: 80 MMHG | BODY MASS INDEX: 35.64 KG/M2 | HEIGHT: 71 IN | SYSTOLIC BLOOD PRESSURE: 124 MMHG | WEIGHT: 254.6 LBS | OXYGEN SATURATION: 98 % | HEART RATE: 98 BPM | RESPIRATION RATE: 16 BRPM

## 2025-03-25 DIAGNOSIS — E11.59 HYPERTENSION ASSOCIATED WITH DIABETES (HCC): Chronic | ICD-10-CM

## 2025-03-25 DIAGNOSIS — I15.2 HYPERTENSION ASSOCIATED WITH DIABETES (HCC): Chronic | ICD-10-CM

## 2025-03-25 DIAGNOSIS — N18.4 STAGE 4 CHRONIC KIDNEY DISEASE (HCC): ICD-10-CM

## 2025-03-25 DIAGNOSIS — Z01.810 PREOP CARDIOVASCULAR EXAM: ICD-10-CM

## 2025-03-25 DIAGNOSIS — E78.5 DYSLIPIDEMIA DUE TO TYPE 2 DIABETES MELLITUS (HCC): Chronic | ICD-10-CM

## 2025-03-25 DIAGNOSIS — Z01.818 PREOP EXAMINATION: ICD-10-CM

## 2025-03-25 DIAGNOSIS — R94.31 ABNORMAL EKG: ICD-10-CM

## 2025-03-25 DIAGNOSIS — E11.69 DYSLIPIDEMIA DUE TO TYPE 2 DIABETES MELLITUS (HCC): Chronic | ICD-10-CM

## 2025-03-25 DIAGNOSIS — I48.21 PERMANENT ATRIAL FIBRILLATION (HCC): ICD-10-CM

## 2025-03-25 DIAGNOSIS — I48.91 ATRIAL FIBRILLATION, UNSPECIFIED TYPE (HCC): ICD-10-CM

## 2025-03-25 LAB — EKG IMPRESSION: NORMAL

## 2025-03-25 PROCEDURE — 99204 OFFICE O/P NEW MOD 45 MIN: CPT | Performed by: INTERNAL MEDICINE

## 2025-03-25 PROCEDURE — 93010 ELECTROCARDIOGRAM REPORT: CPT | Performed by: INTERNAL MEDICINE

## 2025-03-25 PROCEDURE — 3079F DIAST BP 80-89 MM HG: CPT | Performed by: INTERNAL MEDICINE

## 2025-03-25 PROCEDURE — G2211 COMPLEX E/M VISIT ADD ON: HCPCS | Performed by: INTERNAL MEDICINE

## 2025-03-25 PROCEDURE — 3074F SYST BP LT 130 MM HG: CPT | Performed by: INTERNAL MEDICINE

## 2025-03-25 PROCEDURE — 99212 OFFICE O/P EST SF 10 MIN: CPT | Performed by: INTERNAL MEDICINE

## 2025-03-25 PROCEDURE — 93005 ELECTROCARDIOGRAM TRACING: CPT | Mod: TC | Performed by: INTERNAL MEDICINE

## 2025-03-25 PROCEDURE — 99213 OFFICE O/P EST LOW 20 MIN: CPT | Performed by: INTERNAL MEDICINE

## 2025-03-25 ASSESSMENT — ENCOUNTER SYMPTOMS
ABDOMINAL PAIN: 0
DOUBLE VISION: 0
HEADACHES: 0
FALLS: 0
SHORTNESS OF BREATH: 0
HALLUCINATIONS: 0
BRUISES/BLEEDS EASILY: 0
DEPRESSION: 0
BLURRED VISION: 0
VOMITING: 0
LOSS OF CONSCIOUSNESS: 0
DIZZINESS: 0
SPEECH CHANGE: 0
PND: 0
MYALGIAS: 0
SENSORY CHANGE: 0
WEIGHT LOSS: 0
CLAUDICATION: 0
COUGH: 0
ORTHOPNEA: 0
CHILLS: 0
EYE DISCHARGE: 0
FEVER: 0
NAUSEA: 0
PALPITATIONS: 0
EYE PAIN: 0
BLOOD IN STOOL: 0

## 2025-03-25 ASSESSMENT — FIBROSIS 4 INDEX: FIB4 SCORE: 1.64

## 2025-03-25 NOTE — LETTER
PROCEDURE/SURGERY CLEARANCE FORM      Encounter Date: 3/25/2025    Patient: Bennett Callahan  YOB: 1950    CARDIOLOGIST:  Duarte Mays M.D.    REFERRING DOCTOR:  Jonathan Mazariegos M.D.    PATIENT does not have  PPM.    PATIENT does not have  AICD.    The above patient is cleared to have the following procedure/surgery: yes.                                           Additional comments: Ok to stop eliquis 2 days before oral surgery and resume right after surgery if bleeding risk is low.                 MD Carmen Mays M.D.

## 2025-03-25 NOTE — PROGRESS NOTES
Chief Complaint   Patient presents with    Atrial Fibrillation    Dyslipidemia     F/V Dx: Dyslipidemia due to type 2 diabetes mellitus (HCC)    Hypertension     F/V Dx: Hypertension associated with diabetes (HCC)       Shayy Callahan is a 75 y.o. male who presents today for cardiac care and management preop evaluation before his upcoming teeth pulling surgery procedure.  Patient has had atrial fibrillation permanently.  No prior cardioversion.  No prior cardiac procedure or surgery.  He feels well with his A-fib.  No chest pain or shortness of breath.  No palpitation.    I have personally interpreted EKG today with patient, there is no evidence of acute coronary syndrome, no evidence of prior infarct, normal PA and QT interval, no significant conduction disease. Atrial fibrillation with rate of 103.    I have independently interpreted and reviewed blood tests results with patient in clinic today which showed LDL level of 126, triglycerides level of 93, GFR of 26, K of 4.4, Hgba1c of 5.9, UACR of 1609.        Past Medical History:   Diagnosis Date    Hypertension     Type II or unspecified type diabetes mellitus without mention of complication, not stated as uncontrolled      History reviewed. No pertinent surgical history.  History reviewed. No pertinent family history.  Social History     Socioeconomic History    Marital status:      Spouse name: Not on file    Number of children: Not on file    Years of education: Not on file    Highest education level: Not on file   Occupational History    Not on file   Tobacco Use    Smoking status: Never    Smokeless tobacco: Never   Vaping Use    Vaping status: Never Used   Substance and Sexual Activity    Alcohol use: Yes     Alcohol/week: 0.6 oz     Types: 1 Cans of beer per week     Comment: occ, beer    Drug use: No    Sexual activity: Not on file   Other Topics Concern    Not on file   Social History Narrative    Not on file     Social  Drivers of Health     Financial Resource Strain: Not on file   Food Insecurity: Not on file   Transportation Needs: Not on file   Physical Activity: Not on file   Stress: Not on file   Social Connections: Not on file   Intimate Partner Violence: Not on file   Housing Stability: Not on file     Allergies   Allergen Reactions    Metformin      Outpatient Encounter Medications as of 3/25/2025   Medication Sig Dispense Refill    lisinopril-hydrochlorothiazide (PRINZIDE) 20-25 MG per tablet TAKE 1 TABLET BY MOUTH EVERY  Tablet 3    atorvastatin (LIPITOR) 40 MG Tab TAKE 1 TABLET BY MOUTH EVERY  Tablet 3    furosemide (LASIX) 20 MG Tab Take 1 Tablet by mouth 1 time a day as needed (water retention). 30 Tablet 6    OZEMPIC, 1 MG/DOSE, 4 MG/3ML Solution Pen-injector INJECT 1 MG UNDER THE SKIN EVERY 7 DAYS 9 mL 1    amLODIPine (NORVASC) 10 MG Tab TAKE 1 TABLET BY MOUTH EVERY DAY IN THE EVENING 90 Tablet 3    apixaban (ELIQUIS) 5mg Tab TAKE 1 TABLET BY MOUTH TWICE A DAY 60 Tablet 6    Blood Glucose Test Strips FREESTYLE FREEDOM LITE . Check blood sugar 1x per day .  ICD10 code E11.69 100 Strip 3    Insulin Pen Needle 32 G x 4 mm For Toujeo BD  Sterile needles  0.23 x 4mm  32Gx 5/32 100 Each 6    Blood Glucose Test Strips ONE TOUCH test strips . Check blood sugar 1x per day and prn ssx of high or low sugar 200 Strip 3    aspirin EC (ECOTRIN) 81 MG Tablet Delayed Response Take 81 mg by mouth every day.       No facility-administered encounter medications on file as of 3/25/2025.     Review of Systems   Constitutional:  Negative for chills, fever, malaise/fatigue and weight loss.   HENT:  Negative for ear discharge, ear pain, hearing loss and nosebleeds.    Eyes:  Negative for blurred vision, double vision, pain and discharge.   Respiratory:  Negative for cough and shortness of breath.    Cardiovascular:  Negative for chest pain, palpitations, orthopnea, claudication, leg swelling and PND.   Gastrointestinal:   "Negative for abdominal pain, blood in stool, melena, nausea and vomiting.   Genitourinary:  Negative for dysuria and hematuria.   Musculoskeletal:  Negative for falls, joint pain and myalgias.   Skin:  Negative for itching and rash.   Neurological:  Negative for dizziness, sensory change, speech change, loss of consciousness and headaches.   Endo/Heme/Allergies:  Negative for environmental allergies. Does not bruise/bleed easily.   Psychiatric/Behavioral:  Negative for depression, hallucinations and suicidal ideas.               Objective     /80 (BP Location: Left arm, Patient Position: Sitting, BP Cuff Size: Adult)   Pulse 98   Resp 16   Ht 1.803 m (5' 10.98\")   Wt 115 kg (254 lb 9.6 oz)   SpO2 98%   BMI 35.53 kg/m²     Physical Exam  Vitals and nursing note reviewed.   Constitutional:       General: He is not in acute distress.     Appearance: He is not diaphoretic.   HENT:      Head: Normocephalic and atraumatic.      Right Ear: External ear normal.      Left Ear: External ear normal.      Nose: No congestion or rhinorrhea.   Eyes:      General:         Right eye: No discharge.         Left eye: No discharge.   Neck:      Thyroid: No thyromegaly.      Vascular: No JVD.   Cardiovascular:      Rate and Rhythm: Normal rate. Rhythm irregular.      Pulses: Normal pulses.   Pulmonary:      Effort: No respiratory distress.   Abdominal:      General: There is no distension.      Tenderness: There is no abdominal tenderness.   Musculoskeletal:         General: No swelling or tenderness.      Right lower leg: No edema.      Left lower leg: No edema.   Skin:     General: Skin is warm and dry.   Neurological:      Mental Status: He is alert and oriented to person, place, and time.      Cranial Nerves: No cranial nerve deficit.   Psychiatric:         Behavior: Behavior normal.                Assessment & Plan     1. Permanent atrial fibrillation (HCC)  EKG      2. Dyslipidemia due to type 2 diabetes mellitus " (HCC)  EKG      3. Hypertension associated with diabetes (HCC)  EKG      4. Preop cardiovascular exam        5. Abnormal EKG [R94.31]        6. Atrial fibrillation, unspecified type (HCC) [I48.91]        7. Preop examination [Z01.818]        8. Stage 4 chronic kidney disease (HCC)            Medical Decision Making: Today's Assessment/Status/Plan:    Patient is not in acute coronary syndrome presentation, is not having any active TIA or stroke symptoms, as not having decompensated heart failure, is not symptomatic in terms of presyncope pain or syncope. No evidence of significant valvular disease.    I explained to patient that further cardiac testing or procedures will not lower his overall cardiovascular risk for the surgery.  Patient remains low to moderate cardiovascular risk for his oral surgery. Ok to stop Eliquis 2 says prior to surgery and restart post surgery.    Continue DM management with PMD.    Continue Atorvastatin 40 mg daily.    Blood pressure is well controlled.  Continue Amlodipine 10 mg daily, Prinzide.    This visit encounter signifies the visit complexity inherent to evaluation and management associated with medical care services that serve as the continuing focal point for all needed health care services and/or with medical care services that are part of ongoing care related to this patient's single, serious condition, complex cardiac condition.    Duarte Mays M.D.

## 2025-03-25 NOTE — Clinical Note
[facility] - [facility] by [facility]   [address], [address]  [place] [address]  Phone: [phone]  Fax: [phone]              Bennett Leonides Callahan  1950    Encounter Date: 3/25/2025    Duarte Mays M.D.          PROGRESS NOTE:  No notes on file      Jonathan Mazariegos M.D.  [address] [place] [id number]  Via In Basket

## 2025-05-06 ENCOUNTER — NON-PROVIDER VISIT (OUTPATIENT)
Dept: VASCULAR LAB | Facility: MEDICAL CENTER | Age: 75
End: 2025-05-06
Attending: INTERNAL MEDICINE
Payer: MEDICARE

## 2025-05-06 VITALS — HEART RATE: 77 BPM | SYSTOLIC BLOOD PRESSURE: 171 MMHG | DIASTOLIC BLOOD PRESSURE: 81 MMHG

## 2025-05-06 DIAGNOSIS — E11.59 HYPERTENSION ASSOCIATED WITH DIABETES (HCC): Chronic | ICD-10-CM

## 2025-05-06 DIAGNOSIS — E11.9 TYPE 2 DIABETES MELLITUS WITHOUT COMPLICATION, WITHOUT LONG-TERM CURRENT USE OF INSULIN (HCC): ICD-10-CM

## 2025-05-06 DIAGNOSIS — I15.2 HYPERTENSION ASSOCIATED WITH DIABETES (HCC): Chronic | ICD-10-CM

## 2025-05-06 PROCEDURE — 99212 OFFICE O/P EST SF 10 MIN: CPT | Performed by: PHARMACIST

## 2025-05-06 RX ORDER — DOXAZOSIN 1 MG/1
1 TABLET ORAL DAILY
Qty: 30 TABLET | Refills: 1 | Status: SHIPPED | OUTPATIENT
Start: 2025-05-06 | End: 2025-05-30

## 2025-05-06 RX ORDER — SEMAGLUTIDE 1.34 MG/ML
1 INJECTION, SOLUTION SUBCUTANEOUS
Qty: 9 ML | Refills: 1 | Status: SHIPPED | OUTPATIENT
Start: 2025-05-06

## 2025-05-06 NOTE — PROGRESS NOTES
Patient Consult Note    Primary care physician: Jonathan Mazariegos M.D.    Date of Referral: 8/28/24    Reason for consult: Management of Controlled Type 2 Diabetes    HPI:  Bennett Callahan is a 74 y.o. old patient who comes in today for evaluation of above stated problem.    Allergies  Patient has no known allergies.    Current Diabetes Medication Regimen  GLP-1 or GLP-1/GIP Agent: semaglutide (Ozempic) 1 mg weekly     Previous Diabetes Medications and Reason for Discontinuation  Toujeo - controlled BG  Metformin - CI d/t renal indices    Potential Barriers to Care:  Adherence: denies missed doses  Side effects: appetite suppression  Affordability: covered by insurance    SMBG  Pt has home glucometer and proper testing technique - yes  Discussed BG Goals: FBG 80 - 130, 2hPP < 180, A1c < 7.0%    Pt reports blood sugars:   Before Breakfast: ~100    Hypoglycemia  Hypoglycemia awareness: Yes  Nocturnal hypoglycemia: None  Hypoglycemia:  None  Pt's treatment of Hypoglycemia  Discussed 15:15 Rule    Lifestyle  Current Exercise - walk 30-45 minutes daily. Patient likes fishing. Uses stationary cycling machine.     Dietary - Notes continued appetite suppression   Breakfast - oatmeal  Lunch - skips lunch   Dinner - small portion consisting of pork, chicken, vegetables, sometimes rice  Snacks - Pudding, apple sauce (soft foods d/t recent dental procedure)  Drinks - coffee 2 cups every morning, water, sweetened ice tea once per day in summer, tomato/cranberry/orange juice    Labs  Lab Results   Component Value Date/Time    HBA1C 5.9 (A) 02/18/2025 09:39 AM      Lab Results   Component Value Date/Time    SODIUM 138 02/20/2025 07:16 AM    POTASSIUM 4.4 02/20/2025 07:16 AM    CHLORIDE 106 02/20/2025 07:16 AM    CO2 19 (L) 02/20/2025 07:16 AM    GLUCOSE 137 (H) 02/20/2025 07:16 AM    BUN 40 (H) 02/20/2025 07:16 AM    CREATININE 2.55 (H) 02/20/2025 07:16 AM    BUNCREATRAT 14 06/09/2021 04:13 AM     Lab Results   Component Value  Date/Time    ALKPHOSPHAT 114 (H) 07/25/2024 07:38 AM    ASTSGOT 16 07/25/2024 07:38 AM    ALTSGPT 8 07/25/2024 07:38 AM    TBILIRUBIN 0.8 07/25/2024 07:38 AM    INR 1.16 (H) 02/20/2025 07:16 AM    ALBUMIN 3.7 07/25/2024 07:38 AM      Lab Results   Component Value Date/Time    CHOLSTRLTOT 184 02/20/2025 07:16 AM     (H) 02/20/2025 07:16 AM    HDL 39 (A) 02/20/2025 07:16 AM    TRIGLYCERIDE 93 02/20/2025 07:16 AM       Lab Results   Component Value Date/Time    MALBCRT 1609 (H) 02/20/2025 07:16 AM    MICROALBUR 112.0 02/20/2025 07:16 AM       Physical Examination:  Vital signs: BP (!) 171/81   Pulse 77  There is no height or weight on file to calculate BMI.    Assessment and Plan:    1. DM2  Since last visit, pt Dc'd insulin and metformin. Continues on Ozempic 1 mg SQ Q7D w/out issue. Continues to note appetite suppression.  Discussed Goals: FBG 80 - 130, 2hPP < 180, a1c < 7.0%  Last a1c drawn on 2/18/25 was 5.9%, which is at goal and worsened from last (5.6% on 7/25/24). Will be due for repeat in ~ 2 weeks.  Pt reported SMBG at goal.  Pt continues to be hypertensive. Not consistently monitoring at home. Denies s/sx of HTN today in clinic.  Scheduled pt for initial HTN visit at next f/u.    - Medication changes:  START doxazosin 1 mg once daily  Counseled pt regarding MOA, SE, and administration.  Requested pt start monitoring BP at home regularly, keep a log, and present log at f/u.  - Continue:  Ozempic 1 mg SQ Q7D    - Lifestyle changes:  Diet: Maximize lean proteins and veggies. Cut out/down on carbs. Avoid simple sugars.   Exercise: Increase as tolerated. Aim for at least 150 min/week of anything aerobic.    - Preventative management:  REC DM Score: N/a  Care gaps addressed:   Retinal Exam Due: Scheduled for June 2025  Care gaps updated in Health Maintenance    Follow Up:  3 weeks for initial HTN visit    Alex Singh PharmD, BCACP    CC:   Jonathan Mazariegos M.D.

## 2025-05-29 ENCOUNTER — NON-PROVIDER VISIT (OUTPATIENT)
Dept: VASCULAR LAB | Facility: MEDICAL CENTER | Age: 75
End: 2025-05-29
Attending: INTERNAL MEDICINE
Payer: MEDICARE

## 2025-05-29 VITALS
WEIGHT: 247.6 LBS | BODY MASS INDEX: 34.55 KG/M2 | SYSTOLIC BLOOD PRESSURE: 124 MMHG | HEART RATE: 83 BPM | DIASTOLIC BLOOD PRESSURE: 75 MMHG

## 2025-05-29 DIAGNOSIS — E11.59 HYPERTENSION ASSOCIATED WITH DIABETES (HCC): Primary | ICD-10-CM

## 2025-05-29 DIAGNOSIS — I15.2 HYPERTENSION ASSOCIATED WITH DIABETES (HCC): Primary | ICD-10-CM

## 2025-05-29 DIAGNOSIS — I15.2 HYPERTENSION ASSOCIATED WITH DIABETES (HCC): Chronic | ICD-10-CM

## 2025-05-29 DIAGNOSIS — E11.59 HYPERTENSION ASSOCIATED WITH DIABETES (HCC): Chronic | ICD-10-CM

## 2025-05-29 PROCEDURE — 99213 OFFICE O/P EST LOW 20 MIN: CPT

## 2025-05-29 ASSESSMENT — FIBROSIS 4 INDEX: FIB4 SCORE: 1.64

## 2025-05-29 NOTE — PROGRESS NOTES
Pharmacotherapy Hypertension Visit    Date Referral Placed: 08/28/24    Goal Date when HTN will be controlled: 08/29/25 (3 months from first visit)    Type of Visit:  Initial Visit    Bennett Callahan has been referred for evaluation and management of hypertension    HPI:   Vitals:    05/29/25 0825   BP: 124/75   BP Location: Right arm   Patient Position: Sitting   BP Cuff Size: Large adult   Pulse: 83   Weight: 112 kg (247 lb 9.6 oz)     Age at Initial Diagnosis: at least > 10 years ago      Home BP readings:   Not checking consistently, only when he goes to [facility] and uses their BP cuff. Last BP to his recollection was 148/80 (after initiation of doxazosin).  Any readings above  180/110:  None   Jacey symptoms of high blood pressure (TIA, Stroke, Head ache, vision changes): None    Current Prescription HTN Medications - including dose:    Lisinopril/hydrochlorothiazide 20/25 mg daily  Amlodipine 10 mg daily  Doxazosin 1 mg daily    *Of note, pt is also on furosemide 20 mg daily PRN for water retention -- only takes once a week if at all    Current side effects potentially related to antihypertensive medications (lightheaded, dizziness, leg swelling): None    Current Adherence to Blood Pressure Medications: Complete, however pt admits to non-adherence to atorvastatin    Previously attempted BP medications:   None    Any current interfering substances: Drinks 2-3 cups of coffee in the morning    Any current lifestyle issues affecting BP:  None    In the past 6 months have pt had the following (if no, then order)  EKG  Microalbumin  Electrolytes and eGFR  TSH  CBC  Fasting lipid panel  Fasting glucose    Since last visit any of the below   Creatinine increase > 0.5  Potassium > 5 or < 3.5  New edema present  Hyponatremia    Lab Results   Component Value Date/Time    SODIUM 138 02/20/2025 07:16 AM    POTASSIUM 4.4 02/20/2025 07:16 AM    CHLORIDE 106 02/20/2025 07:16 AM    CO2 19 (L) 02/20/2025 07:16 AM     GLUCOSE 137 (H) 02/20/2025 07:16 AM    BUN 40 (H) 02/20/2025 07:16 AM    CREATININE 2.55 (H) 02/20/2025 07:16 AM    BUNCREATRAT 14 06/09/2021 04:13 AM      Medications Reconciled    CURRENT MEDICATIONS:   Current Outpatient Medications:     Ozempic (1 MG/DOSE), 1 mg, Subcutaneous, Q7 DAYS, Taking    doxazosin, 1 mg, Oral, DAILY, Taking    lisinopril-hydrochlorothiazide, 1 Tablet, Oral, QDAY, Taking    furosemide, 20 mg, Oral, QDAY PRN, Taking As Needed    amLODIPine, 10 mg, Oral, Q EVENING, Taking    Eliquis, 5 mg, Oral, BID, Taking    aspirin EC, 81 mg, Oral, DAILY, Taking    atorvastatin, 40 mg, Oral, DAILY (Patient not taking: Reported on 5/29/2025), Not Taking    Blood Glucose Test Strips, FREESTYLE FREEDOM LITE . Check blood sugar 1x per day .  ICD10 code E11.69    Insulin Pen Needle 32 G x 4 mm, For Toujeo BD  Sterile needles  0.23 x 4mm  32Gx 5/32    Blood Glucose Test Strips, ONE TOUCH test strips . Check blood sugar 1x per day and prn ssx of high or low sugar    ALLERGIES: Metformin    SOCIAL HISTORY   Tobacco Use History[1]    Change in weight: Gradual weight loss since initiation of Ozempic  Exercise habits: Minimal exercise -- goes on walks, fishing, chair exercises  Diet: Common adult, denies high sodium intake    Most recent 24 hours ABPM results if available: N/A    ASSESSMENT AND PLAN    BP is at goal    BP Goal 130/80  Pt has CKD and was referred by PCP to Nephrology. Reminded pt to establish care.    BP Monitoring Recommendations - Home BP   Recommended for pt to purchase BP cuff and monitor BP regularly.  Proper technique for blood pressure monitoring reviewed with patient.  Pt instructed to check BP at varying times through out the day 4 times per week.    Lifestyle Recommendations From Today’s Visit:    Weight reduction  DASH or mediterranean style eating plan  Dietary sodium restriction  Decrease caffeine consumption  Increase physical activity  Continue avoidance of tobacco and  alcohol    Medication recommendations from today's visit:   Continue lisinopril/hydrochlorothiazide 20/25 mg daily  Continue amlodipine 10 mg daily  Continue doxazosin 1 mg daily    Importance of adherence discussed    Other recommendations: Resume atorvastatin 40 mg daily    Studies Ordered at Todays Visit: None  Blood Work Ordered At Today’s visit: CMP, TSH with reflex to T4, Lipid Panel  Follow-Up: 6 weeks    Radha Mahajan, PharmD    Agree with above.   Worsening renal function concerning - await nephrology recommendations  If needs additional BP lowering medication, tryvio could be a good option given CKD.   May benefit from referral to resistant hypertension clinic if proves difficult to control  F/u in pharmacotherapy clinic as scheduled for now.    Michael Bloch, MD  [facility]    CC:  Jonathan Mazariegos M.D.           [1]   Social History  Tobacco Use   Smoking Status Never   Smokeless Tobacco Never

## 2025-05-30 RX ORDER — DOXAZOSIN 1 MG/1
1 TABLET ORAL DAILY
Qty: 90 TABLET | Refills: 1 | Status: SHIPPED | OUTPATIENT
Start: 2025-05-30

## 2025-07-23 ENCOUNTER — HOSPITAL ENCOUNTER (OUTPATIENT)
Dept: LAB | Facility: MEDICAL CENTER | Age: 75
End: 2025-07-23
Payer: MEDICARE

## 2025-07-23 ENCOUNTER — HOSPITAL ENCOUNTER (OUTPATIENT)
Dept: LAB | Facility: MEDICAL CENTER | Age: 75
End: 2025-07-23
Attending: NURSE PRACTITIONER
Payer: MEDICARE

## 2025-07-23 DIAGNOSIS — I15.2 HYPERTENSION ASSOCIATED WITH DIABETES (HCC): ICD-10-CM

## 2025-07-23 DIAGNOSIS — E11.59 HYPERTENSION ASSOCIATED WITH DIABETES (HCC): ICD-10-CM

## 2025-07-23 LAB
ALBUMIN SERPL BCP-MCNC: 3.8 G/DL (ref 3.2–4.9)
ALBUMIN/GLOB SERPL: 1.4 G/DL
ALP SERPL-CCNC: 111 U/L (ref 30–99)
ALT SERPL-CCNC: 30 U/L (ref 2–50)
ANION GAP SERPL CALC-SCNC: 12 MMOL/L (ref 7–16)
AST SERPL-CCNC: 28 U/L (ref 12–45)
BILIRUB SERPL-MCNC: 0.7 MG/DL (ref 0.1–1.5)
BUN SERPL-MCNC: 36 MG/DL (ref 8–22)
CALCIUM ALBUM COR SERPL-MCNC: 9.1 MG/DL (ref 8.5–10.5)
CALCIUM SERPL-MCNC: 8.9 MG/DL (ref 8.5–10.5)
CHLORIDE SERPL-SCNC: 105 MMOL/L (ref 96–112)
CHOLEST SERPL-MCNC: 142 MG/DL (ref 100–199)
CO2 SERPL-SCNC: 21 MMOL/L (ref 20–33)
CREAT SERPL-MCNC: 2.52 MG/DL (ref 0.5–1.4)
FASTING STATUS PATIENT QL REPORTED: NORMAL
GFR SERPLBLD CREATININE-BSD FMLA CKD-EPI: 26 ML/MIN/1.73 M 2
GLOBULIN SER CALC-MCNC: 2.8 G/DL (ref 1.9–3.5)
GLUCOSE SERPL-MCNC: 129 MG/DL (ref 65–99)
HDLC SERPL-MCNC: 31 MG/DL
LDLC SERPL CALC-MCNC: 82 MG/DL
POTASSIUM SERPL-SCNC: 4.7 MMOL/L (ref 3.6–5.5)
PROT SERPL-MCNC: 6.6 G/DL (ref 6–8.2)
SODIUM SERPL-SCNC: 138 MMOL/L (ref 135–145)
TRIGL SERPL-MCNC: 144 MG/DL (ref 0–149)
TSH SERPL DL<=0.005 MIU/L-ACNC: 3.01 UIU/ML (ref 0.38–5.33)

## 2025-07-23 PROCEDURE — 36415 COLL VENOUS BLD VENIPUNCTURE: CPT

## 2025-07-23 PROCEDURE — 80061 LIPID PANEL: CPT

## 2025-07-23 PROCEDURE — 80053 COMPREHEN METABOLIC PANEL: CPT

## 2025-07-23 PROCEDURE — 84443 ASSAY THYROID STIM HORMONE: CPT

## 2025-08-01 ENCOUNTER — OFFICE VISIT (OUTPATIENT)
Dept: VASCULAR LAB | Facility: MEDICAL CENTER | Age: 75
End: 2025-08-01
Attending: INTERNAL MEDICINE
Payer: MEDICARE

## 2025-08-01 VITALS — DIASTOLIC BLOOD PRESSURE: 86 MMHG | SYSTOLIC BLOOD PRESSURE: 143 MMHG | HEART RATE: 91 BPM

## 2025-08-01 DIAGNOSIS — Z86.73 HISTORY OF STROKE: ICD-10-CM

## 2025-08-01 DIAGNOSIS — E11.59 HYPERTENSION ASSOCIATED WITH DIABETES (HCC): Primary | ICD-10-CM

## 2025-08-01 DIAGNOSIS — I15.2 HYPERTENSION ASSOCIATED WITH DIABETES (HCC): Primary | ICD-10-CM

## 2025-08-01 PROCEDURE — 99214 OFFICE O/P EST MOD 30 MIN: CPT

## 2025-08-01 RX ORDER — DOXAZOSIN 2 MG/1
2 TABLET ORAL DAILY
Qty: 90 TABLET | Refills: 3 | Status: SHIPPED | OUTPATIENT
Start: 2025-08-01

## 2025-08-29 ENCOUNTER — OFFICE VISIT (OUTPATIENT)
Dept: VASCULAR LAB | Facility: MEDICAL CENTER | Age: 75
End: 2025-08-29
Attending: INTERNAL MEDICINE
Payer: MEDICARE

## 2025-08-29 VITALS — SYSTOLIC BLOOD PRESSURE: 142 MMHG | DIASTOLIC BLOOD PRESSURE: 79 MMHG | HEART RATE: 78 BPM

## 2025-08-29 DIAGNOSIS — I15.2 HYPERTENSION ASSOCIATED WITH DIABETES (HCC): Primary | Chronic | ICD-10-CM

## 2025-08-29 DIAGNOSIS — E11.59 HYPERTENSION ASSOCIATED WITH DIABETES (HCC): Primary | Chronic | ICD-10-CM

## 2025-08-29 PROCEDURE — 99213 OFFICE O/P EST LOW 20 MIN: CPT
